# Patient Record
Sex: MALE | Race: WHITE | Employment: OTHER | URBAN - METROPOLITAN AREA
[De-identification: names, ages, dates, MRNs, and addresses within clinical notes are randomized per-mention and may not be internally consistent; named-entity substitution may affect disease eponyms.]

---

## 2020-07-03 ENCOUNTER — APPOINTMENT (OUTPATIENT)
Dept: GENERAL RADIOLOGY | Age: 75
DRG: 286 | End: 2020-07-03
Attending: EMERGENCY MEDICINE
Payer: MEDICARE

## 2020-07-03 ENCOUNTER — HOSPITAL ENCOUNTER (EMERGENCY)
Age: 75
Discharge: ANOTHER HOSPITAL | DRG: 286 | End: 2020-07-03
Attending: EMERGENCY MEDICINE
Payer: MEDICARE

## 2020-07-03 ENCOUNTER — HOSPITAL ENCOUNTER (INPATIENT)
Age: 75
LOS: 5 days | Discharge: HOME | DRG: 286 | End: 2020-07-08
Attending: INTERNAL MEDICINE | Admitting: INTERNAL MEDICINE
Payer: MEDICARE

## 2020-07-03 VITALS
BODY MASS INDEX: 27.09 KG/M2 | OXYGEN SATURATION: 97 % | HEART RATE: 98 BPM | HEIGHT: 72 IN | WEIGHT: 200 LBS | TEMPERATURE: 98.1 F | RESPIRATION RATE: 16 BRPM | DIASTOLIC BLOOD PRESSURE: 98 MMHG | SYSTOLIC BLOOD PRESSURE: 160 MMHG

## 2020-07-03 DIAGNOSIS — I48.91 NEW ONSET A-FIB (HCC): Primary | ICD-10-CM

## 2020-07-03 DIAGNOSIS — I50.9 CONGESTIVE HEART FAILURE, UNSPECIFIED HF CHRONICITY, UNSPECIFIED HEART FAILURE TYPE (HCC): ICD-10-CM

## 2020-07-03 PROBLEM — R60.0 BILATERAL LOWER EXTREMITY EDEMA: Status: ACTIVE | Noted: 2020-07-03

## 2020-07-03 PROBLEM — E87.6 HYPOKALEMIA: Status: ACTIVE | Noted: 2020-07-03

## 2020-07-03 PROBLEM — R06.02 SOB (SHORTNESS OF BREATH): Status: ACTIVE | Noted: 2020-07-03

## 2020-07-03 PROBLEM — R53.83 FATIGUE: Status: ACTIVE | Noted: 2020-07-03

## 2020-07-03 LAB
ALBUMIN SERPL-MCNC: 4 G/DL (ref 3.2–4.6)
ALBUMIN/GLOB SERPL: 1.2 {RATIO} (ref 1.2–3.5)
ALP SERPL-CCNC: 109 U/L (ref 50–136)
ALT SERPL-CCNC: 27 U/L (ref 12–65)
ANION GAP SERPL CALC-SCNC: 8 MMOL/L (ref 7–16)
APTT PPP: 34 SEC (ref 24.3–35.4)
AST SERPL-CCNC: 23 U/L (ref 15–37)
BASOPHILS # BLD: 0.1 K/UL (ref 0–0.2)
BASOPHILS NFR BLD: 1 % (ref 0–2)
BILIRUB SERPL-MCNC: 1.4 MG/DL (ref 0.2–1.1)
BNP SERPL-MCNC: 6251 PG/ML (ref 5–125)
BUN SERPL-MCNC: 10 MG/DL (ref 8–23)
CALCIUM SERPL-MCNC: 8.9 MG/DL (ref 8.3–10.4)
CHLORIDE SERPL-SCNC: 113 MMOL/L (ref 98–107)
CO2 SERPL-SCNC: 24 MMOL/L (ref 21–32)
CREAT SERPL-MCNC: 1.1 MG/DL (ref 0.8–1.5)
DIFFERENTIAL METHOD BLD: ABNORMAL
EOSINOPHIL # BLD: 0.1 K/UL (ref 0–0.8)
EOSINOPHIL NFR BLD: 1 % (ref 0.5–7.8)
ERYTHROCYTE [DISTWIDTH] IN BLOOD BY AUTOMATED COUNT: 14.8 % (ref 11.9–14.6)
GLOBULIN SER CALC-MCNC: 3.4 G/DL (ref 2.3–3.5)
GLUCOSE SERPL-MCNC: 112 MG/DL (ref 65–100)
HCT VFR BLD AUTO: 39.4 % (ref 41.1–50.3)
HGB BLD-MCNC: 13 G/DL (ref 13.6–17.2)
IMM GRANULOCYTES # BLD AUTO: 0 K/UL (ref 0–0.5)
IMM GRANULOCYTES NFR BLD AUTO: 0 % (ref 0–5)
LYMPHOCYTES # BLD: 1.6 K/UL (ref 0.5–4.6)
LYMPHOCYTES NFR BLD: 19 % (ref 13–44)
MAGNESIUM SERPL-MCNC: 2 MG/DL (ref 1.8–2.4)
MCH RBC QN AUTO: 31.3 PG (ref 26.1–32.9)
MCHC RBC AUTO-ENTMCNC: 33 G/DL (ref 31.4–35)
MCV RBC AUTO: 94.7 FL (ref 79.6–97.8)
MONOCYTES # BLD: 0.7 K/UL (ref 0.1–1.3)
MONOCYTES NFR BLD: 8 % (ref 4–12)
NEUTS SEG # BLD: 6.1 K/UL (ref 1.7–8.2)
NEUTS SEG NFR BLD: 72 % (ref 43–78)
NRBC # BLD: 0 K/UL (ref 0–0.2)
PLATELET # BLD AUTO: 154 K/UL (ref 150–450)
PMV BLD AUTO: 10.5 FL (ref 9.4–12.3)
POTASSIUM SERPL-SCNC: 2.7 MMOL/L (ref 3.5–5.1)
PROT SERPL-MCNC: 7.4 G/DL (ref 6.3–8.2)
RBC # BLD AUTO: 4.16 M/UL (ref 4.23–5.6)
SODIUM SERPL-SCNC: 145 MMOL/L (ref 136–145)
TROPONIN-HIGH SENSITIVITY: 98.5 PG/ML (ref 0–14)
TSH SERPL DL<=0.005 MIU/L-ACNC: 1.63 UIU/ML
WBC # BLD AUTO: 8.6 K/UL (ref 4.3–11.1)

## 2020-07-03 PROCEDURE — B2111ZZ FLUOROSCOPY OF MULTIPLE CORONARY ARTERIES USING LOW OSMOLAR CONTRAST: ICD-10-PCS | Performed by: INTERNAL MEDICINE

## 2020-07-03 PROCEDURE — 93005 ELECTROCARDIOGRAM TRACING: CPT | Performed by: EMERGENCY MEDICINE

## 2020-07-03 PROCEDURE — 74011000250 HC RX REV CODE- 250: Performed by: NURSE PRACTITIONER

## 2020-07-03 PROCEDURE — 74011250637 HC RX REV CODE- 250/637: Performed by: EMERGENCY MEDICINE

## 2020-07-03 PROCEDURE — 85730 THROMBOPLASTIN TIME PARTIAL: CPT

## 2020-07-03 PROCEDURE — 4A023N7 MEASUREMENT OF CARDIAC SAMPLING AND PRESSURE, LEFT HEART, PERCUTANEOUS APPROACH: ICD-10-PCS | Performed by: INTERNAL MEDICINE

## 2020-07-03 PROCEDURE — 96374 THER/PROPH/DIAG INJ IV PUSH: CPT

## 2020-07-03 PROCEDURE — 85025 COMPLETE CBC W/AUTO DIFF WBC: CPT

## 2020-07-03 PROCEDURE — B2151ZZ FLUOROSCOPY OF LEFT HEART USING LOW OSMOLAR CONTRAST: ICD-10-PCS | Performed by: INTERNAL MEDICINE

## 2020-07-03 PROCEDURE — 71046 X-RAY EXAM CHEST 2 VIEWS: CPT

## 2020-07-03 PROCEDURE — 74011250636 HC RX REV CODE- 250/636: Performed by: EMERGENCY MEDICINE

## 2020-07-03 PROCEDURE — 74011000258 HC RX REV CODE- 258: Performed by: NURSE PRACTITIONER

## 2020-07-03 PROCEDURE — 83735 ASSAY OF MAGNESIUM: CPT

## 2020-07-03 PROCEDURE — 96375 TX/PRO/DX INJ NEW DRUG ADDON: CPT

## 2020-07-03 PROCEDURE — 99284 EMERGENCY DEPT VISIT MOD MDM: CPT

## 2020-07-03 PROCEDURE — 84484 ASSAY OF TROPONIN QUANT: CPT

## 2020-07-03 PROCEDURE — 80053 COMPREHEN METABOLIC PANEL: CPT

## 2020-07-03 PROCEDURE — 84443 ASSAY THYROID STIM HORMONE: CPT

## 2020-07-03 PROCEDURE — 83880 ASSAY OF NATRIURETIC PEPTIDE: CPT

## 2020-07-03 PROCEDURE — 74011000250 HC RX REV CODE- 250: Performed by: EMERGENCY MEDICINE

## 2020-07-03 PROCEDURE — 65660000000 HC RM CCU STEPDOWN

## 2020-07-03 PROCEDURE — 74011250636 HC RX REV CODE- 250/636: Performed by: NURSE PRACTITIONER

## 2020-07-03 RX ORDER — HEPARIN SODIUM 5000 [USP'U]/100ML
12-25 INJECTION, SOLUTION INTRAVENOUS
Status: DISCONTINUED | OUTPATIENT
Start: 2020-07-03 | End: 2020-07-03 | Stop reason: HOSPADM

## 2020-07-03 RX ORDER — NALOXONE HYDROCHLORIDE 0.4 MG/ML
0.4 INJECTION, SOLUTION INTRAMUSCULAR; INTRAVENOUS; SUBCUTANEOUS AS NEEDED
Status: DISCONTINUED | OUTPATIENT
Start: 2020-07-03 | End: 2020-07-08 | Stop reason: HOSPADM

## 2020-07-03 RX ORDER — SODIUM CHLORIDE 0.9 % (FLUSH) 0.9 %
5-40 SYRINGE (ML) INJECTION AS NEEDED
Status: DISCONTINUED | OUTPATIENT
Start: 2020-07-03 | End: 2020-07-08 | Stop reason: HOSPADM

## 2020-07-03 RX ORDER — POTASSIUM CHLORIDE 14.9 MG/ML
20 INJECTION INTRAVENOUS
Status: COMPLETED | OUTPATIENT
Start: 2020-07-03 | End: 2020-07-03

## 2020-07-03 RX ORDER — DOCUSATE SODIUM 100 MG/1
100 CAPSULE, LIQUID FILLED ORAL
Status: DISCONTINUED | OUTPATIENT
Start: 2020-07-03 | End: 2020-07-08 | Stop reason: HOSPADM

## 2020-07-03 RX ORDER — MORPHINE SULFATE 2 MG/ML
2 INJECTION, SOLUTION INTRAMUSCULAR; INTRAVENOUS
Status: DISCONTINUED | OUTPATIENT
Start: 2020-07-03 | End: 2020-07-08 | Stop reason: HOSPADM

## 2020-07-03 RX ORDER — DILTIAZEM HYDROCHLORIDE 5 MG/ML
10 INJECTION INTRAVENOUS ONCE
Status: COMPLETED | OUTPATIENT
Start: 2020-07-03 | End: 2020-07-03

## 2020-07-03 RX ORDER — MAG HYDROX/ALUMINUM HYD/SIMETH 200-200-20
30 SUSPENSION, ORAL (FINAL DOSE FORM) ORAL
Status: DISCONTINUED | OUTPATIENT
Start: 2020-07-03 | End: 2020-07-08 | Stop reason: HOSPADM

## 2020-07-03 RX ORDER — HEPARIN SODIUM 5000 [USP'U]/100ML
12-25 INJECTION, SOLUTION INTRAVENOUS
Status: DISCONTINUED | OUTPATIENT
Start: 2020-07-03 | End: 2020-07-04

## 2020-07-03 RX ORDER — SODIUM CHLORIDE 0.9 % (FLUSH) 0.9 %
5-40 SYRINGE (ML) INJECTION EVERY 8 HOURS
Status: DISCONTINUED | OUTPATIENT
Start: 2020-07-03 | End: 2020-07-08 | Stop reason: HOSPADM

## 2020-07-03 RX ORDER — ACETAMINOPHEN 325 MG/1
650 TABLET ORAL
Status: DISCONTINUED | OUTPATIENT
Start: 2020-07-03 | End: 2020-07-08 | Stop reason: HOSPADM

## 2020-07-03 RX ORDER — DIPHENHYDRAMINE HCL 25 MG
25 CAPSULE ORAL
Status: DISCONTINUED | OUTPATIENT
Start: 2020-07-03 | End: 2020-07-05

## 2020-07-03 RX ORDER — HEPARIN SODIUM 5000 [USP'U]/ML
60 INJECTION, SOLUTION INTRAVENOUS; SUBCUTANEOUS
Status: COMPLETED | OUTPATIENT
Start: 2020-07-03 | End: 2020-07-03

## 2020-07-03 RX ORDER — POTASSIUM CHLORIDE 20 MEQ/1
20 TABLET, EXTENDED RELEASE ORAL
Status: COMPLETED | OUTPATIENT
Start: 2020-07-03 | End: 2020-07-03

## 2020-07-03 RX ORDER — METOPROLOL TARTRATE 5 MG/5ML
5 INJECTION INTRAVENOUS ONCE
Status: COMPLETED | OUTPATIENT
Start: 2020-07-03 | End: 2020-07-03

## 2020-07-03 RX ADMIN — POTASSIUM CHLORIDE 20 MEQ: 14.9 INJECTION, SOLUTION INTRAVENOUS at 18:05

## 2020-07-03 RX ADMIN — POTASSIUM CHLORIDE 20 MEQ: 1500 TABLET, EXTENDED RELEASE ORAL at 17:54

## 2020-07-03 RX ADMIN — SODIUM CHLORIDE 2.5 MG/HR: 900 INJECTION, SOLUTION INTRAVENOUS at 21:10

## 2020-07-03 RX ADMIN — Medication 10 ML: at 21:41

## 2020-07-03 RX ADMIN — DILTIAZEM HYDROCHLORIDE 10 MG: 5 INJECTION, SOLUTION INTRAVENOUS at 20:26

## 2020-07-03 RX ADMIN — HEPARIN SODIUM 5450 UNITS: 5000 INJECTION INTRAVENOUS; SUBCUTANEOUS at 17:56

## 2020-07-03 RX ADMIN — METOPROLOL TARTRATE 5 MG: 5 INJECTION INTRAVENOUS at 16:13

## 2020-07-03 RX ADMIN — HEPARIN SODIUM 12 UNITS/KG/HR: 5000 INJECTION, SOLUTION INTRAVENOUS at 18:00

## 2020-07-03 NOTE — H&P
[facility] CARDIOLOGY   History & Physical                 Primary Cardiologist: None    Primary Care Physician: None    Admitting Physician: Dr. Jose Burciaga:     Patient is a 76 y.o.  male with PMHx of HTN, Anxiety and Thoracic Aortic Aneurysm who presented to the ED at [facility] today with c/o progressive weakness/fatigue and SOB that has worsened over the past month, and BLE edema that started yesterday. He had actually gone to an Urgent Care to have a COVID19 test done in order to get established with his PCP. However, once there he was found to have a tachy, irregular heart rate and HTN. He was sent to the ED at [facility] for further evaluation. In the ED: EKG showed AFib RVR. He was treated with 5mg IV Lopressor with improvement in his HR. He was given IV Heparin bolus/gtt. CXR with mild interstitial edema. He was found to be hypokalemic and was given PO and IV supplementation. Cardiology was called in regards to his arrhythmia. He reports having occasional tachy-palpitations, mostly noted at night when lying down to sleep. He states he has had an irregular heart rate \"for years. \" He denies prior known coronary disease, no MI, PCI or LHC. No CP/pressure. States he saw Cardiology at [facility] re: palpitations and was told they would monitor his HR, but he never went back. States he has had \"borderline\" high BP for years but it was never Tavares Mayi enough\" for meds. States that he is a \"very nervous person\" and worked hard to keep his emotions in check and BP controlled. States no issues with HLD or DM. Prior smoker 1ppd, quit 6 yrs ago. Former ETOH use mostly weekend partying, quit 6 years ago. States he frequently has a cough with some phlegm, takes Robitussin occasionally. Has never seen Pulmonology for any COPD w/u. States he works inside and outside his house and has noted decrease capacity to do this over the past month. He states no known COVID exposures. No F/C/S.  States his wife has a thermal temperature scanner and has been checking his temp every day has not been over 97F. HPI from [facility] 3/2016:  Mr. Atilio Drake is a 51-year-old gentleman recently evaluated by Dr. Yousif Victoria who presents today to review recent noninvasive cardiac evaluations. Mr. Atilio Drake had an abnormal EKG showing probable inferior Q waves and nonspecific intraventricular conduction delay. He was having atypical chest pain and was unable to participate in greater than 4 METS of activity due to sedentary lifestyle and severe bilateral carpal tunnel syndrome. Recently it was discovered he had hypertension and he started on Hydrochlorothiazide and he has had fairly good blood pressure results with this. Mr. Atilio Drake underwent a noninvasive cardiac evaluation. His nuclear stress test demonstrating normal perfusion. It did suggest he had a possibly moderate LV systolic dysfunction with global hypokinesis his estimated ejection fraction by imaging was 35%. He was noted to have frequent PACs. HIs echocardiogram demonstrated low normal ejection fraction with no evidence of global hypokinesis. Estimated ejection fraction of 50 to 54%. HE had Grade 1 diastolic dysfunction. Of note he did have a moderately dilated aortic root and moderately dilated ascending aorta with mild aortic insufficiency. His aortic root diameter is 4.6 cm. No past medical history on file. No past surgical history on file. No Known Allergies  Social History     Tobacco Use    Smoking status: Not on file   Substance Use Topics    Alcohol use: Not on file      FH: No family history on file.      Review of Systems  General: no weight change, +weakness, +fatigue, no fever or chills, no diaphoresis  Skin: no rashes, lumps, wounds, sores or other skin changes  HEENT: no headache, dizziness, lightheadedness, vision changes, hearing changes, tinnitus, vertigo, sinus pressure/pain, bleeding gums, sore throat, or hoarseness  Neck: no swollen glands, goiter, pain or stiffness  Respiratory: no increased cough, no congestion, no sputum, no hemoptysis, +dyspnea, no wheezing  Cardiovascular: + as per HPI  Gastrointestinal: no increased reflux, no constipation, diarrhea, liver problems, GI bleeding, N/V, no abdominal pain or distension  Urinary: no frequency, urgency, hematuria, burning/pain with urination, flank pain, polyuria, nocturia, or difficulty urinating  Peripheral Vascular: no claudication, leg cramps, prior DVTs, +swelling of BLE, no color change, or swelling with redness or tenderness  Musculoskeletal: no muscle or joint pain/stiffness, joint swelling, erythema of joints, or back pain  Psychiatric: no increased depression, anxiety or excessive stress  Neurological: no sensory or motor loss, seizures, syncope, tremors, numbness, tingling, no changes in mood, attention, or speech, no changes in orientation, memory, insight, or judgment. Hematologic: no anemia, easy bruising or bleeding  Endocrine: no thyroid problems, no heat or cold intolerance, excessive sweating, polyuria, polydipsia, no diabetes. Objective: There were no vitals taken for this visit. No intake/output data recorded. No intake/output data recorded.     Physical Exam:  General: well developed, well nourished, NAD, resting comfortably  HEENT: pupils equal and round, no abnormalities noted, sclera clear, EOMs intact  Neck: supple, no JVD, no carotid bruits  Heart: S1S2 with RRR without murmurs, rubs or gallops  Lungs: clear throughout auscultation bilaterally, normal effort on RA, no wheezing or rales  Abd: soft, nontender, nondistended, +bowel sounds  Ext: warm, no edema, calves supple/nontender, pulses 2+ bilaterally  Skin: warm and dry, intact  Psychiatric: appropriate mood and affect, cooperative  Neurologic: A&O X 3, moves all 4 equally, CNs intact      ECG: AFib RVR no acute ST changes    ECHO: ordered and pending    CXR: mild interstitial edema    Data Review:      Recent Results (from the past 24 hour(s))   EKG, 12 LEAD, INITIAL    Collection Time: 07/03/20  3:37 PM   Result Value Ref Range    Ventricular Rate 105 BPM    Atrial Rate 105 BPM    P-R Interval 208 ms    QRS Duration 90 ms    Q-T Interval 324 ms    QTC Calculation (Bezet) 428 ms    Calculated P Axis 60 degrees    Calculated R Axis 22 degrees    Calculated T Axis 108 degrees    Diagnosis       !! AGE AND GENDER SPECIFIC ECG ANALYSIS !! Sinus tachycardia with Premature atrial complexes  ST & T wave abnormality, consider lateral ischemia  Abnormal ECG  No previous ECGs available     CBC WITH AUTOMATED DIFF    Collection Time: 07/03/20  4:05 PM   Result Value Ref Range    WBC 8.6 4.3 - 11.1 K/uL    RBC 4.16 (L) 4.23 - 5.6 M/uL    HGB 13.0 (L) 13.6 - 17.2 g/dL    HCT 39.4 (L) 41.1 - 50.3 %    MCV 94.7 79.6 - 97.8 FL    MCH 31.3 26.1 - 32.9 PG    MCHC 33.0 31.4 - 35.0 g/dL    RDW 14.8 (H) 11.9 - 14.6 %    PLATELET 056 034 - 211 K/uL    MPV 10.5 9.4 - 12.3 FL    ABSOLUTE NRBC 0.00 0.0 - 0.2 K/uL    DF AUTOMATED      NEUTROPHILS 72 43 - 78 %    LYMPHOCYTES 19 13 - 44 %    MONOCYTES 8 4.0 - 12.0 %    EOSINOPHILS 1 0.5 - 7.8 %    BASOPHILS 1 0.0 - 2.0 %    IMMATURE GRANULOCYTES 0 0.0 - 5.0 %    ABS. NEUTROPHILS 6.1 1.7 - 8.2 K/UL    ABS. LYMPHOCYTES 1.6 0.5 - 4.6 K/UL    ABS. MONOCYTES 0.7 0.1 - 1.3 K/UL    ABS. EOSINOPHILS 0.1 0.0 - 0.8 K/UL    ABS. BASOPHILS 0.1 0.0 - 0.2 K/UL    ABS. IMM.  GRANS. 0.0 0.0 - 0.5 K/UL   METABOLIC PANEL, COMPREHENSIVE    Collection Time: 07/03/20  4:05 PM   Result Value Ref Range    Sodium 145 136 - 145 mmol/L    Potassium 2.7 (LL) 3.5 - 5.1 mmol/L    Chloride 113 (H) 98 - 107 mmol/L    CO2 24 21 - 32 mmol/L    Anion gap 8 7 - 16 mmol/L    Glucose 112 (H) 65 - 100 mg/dL    BUN 10 8 - 23 MG/DL    Creatinine 1.10 0.8 - 1.5 MG/DL    GFR est AA >60 >60 ml/min/1.73m2    GFR est non-AA >60 >60 ml/min/1.73m2    Calcium 8.9 8.3 - 10.4 MG/DL    Bilirubin, total 1.4 (H) 0.2 - 1.1 MG/DL    ALT (SGPT) 27 12 - 65 U/L AST (SGOT) 23 15 - 37 U/L    Alk. phosphatase 109 50 - 136 U/L    Protein, total 7.4 6.3 - 8.2 g/dL    Albumin 4.0 3.2 - 4.6 g/dL    Globulin 3.4 2.3 - 3.5 g/dL    A-G Ratio 1.2 1.2 - 3.5     TROPONIN-HIGH SENSITIVITY    Collection Time: 07/03/20  4:05 PM   Result Value Ref Range    Troponin-High Sensitivity 98.5 (HH) 0 - 14 pg/mL   MAGNESIUM    Collection Time: 07/03/20  4:05 PM   Result Value Ref Range    Magnesium 2.0 1.8 - 2.4 mg/dL   TSH 3RD GENERATION    Collection Time: 07/03/20  4:05 PM   Result Value Ref Range    TSH 1.630 uIU/mL   NT-PRO BNP    Collection Time: 07/03/20  4:05 PM   Result Value Ref Range    NT pro-BNP 6,251 (H) 5 - 125 PG/ML   PTT    Collection Time: 07/03/20  4:05 PM   Result Value Ref Range    aPTT 34.0 24.3 - 35.4 SEC   EKG, 12 LEAD, SUBSEQUENT    Collection Time: 07/03/20  4:17 PM   Result Value Ref Range    Ventricular Rate 96 BPM    Atrial Rate 120 BPM    QRS Duration 96 ms    Q-T Interval 310 ms    QTC Calculation (Bezet) 391 ms    Calculated R Axis 19 degrees    Calculated T Axis 113 degrees    Diagnosis       !! AGE AND GENDER SPECIFIC ECG ANALYSIS !!   Atrial fibrillation  Nonspecific ST and T wave abnormality , probably digitalis effect  Abnormal ECG  When compared with ECG of 03-JUL-2020 15:37,  Atrial fibrillation has replaced Sinus rhythm           Assessment/Plan:   Principal Problem:    Atrial fibrillation with rapid ventricular response, uncontrolled -- tachyarrhythmia with weakness/fatigue, SOB and BLE edema -- likely having some tachy-CM from uncontrolled AFib at home, unknown exact duration -- possibly the past month, will plan to cont IV heparin, start IV diltiazem and hope for conversion, NPO at MN for poss IMAN/DCCV in AM, f/u daily labs/lytes to keep K >4, Mag >2, check ECHO and add additional meds as clinically indicated     Active Problems:    Fatigue -- TSH ok, check ECHO      SOB (shortness of breath) -- rhythm control, check ECHO in AM      Bilateral lower extremity edema -- mild, check ECHO in AM, FR and daily weights, consider IV Lasix for worsening resp fxn -- but need to get K up first       Hypokalemia -- supplemented in ED, f/u AM labs    ZARA Nicolas  7/3/2020  6:36 PM    Attending Addendum    Patient independently seen and examined by me. Agree with above note by physician extender with the following additions and exceptions: 76 y.o.  male with PMHx of HTN, Anxiety and Thoracic Aortic Aneurysm who presented to the ED at [facility] today with c/o progressive weakness/fatigue and SOB that has worsened over the past month, and BLE edema and was noted to be in afib with RVR    Key findings are:  No CP or WILLAMS  CV- irreg irreg, tachycardic without murmur  Lungs- Clear bilaterally  Ext- no edema    Plan: check TTE, transition to eliquis 5mg Q12H       --appears Pt has been having periods of NSR on telemetry       --antiarrhythmic based on TTE results       --if afib sustains, may need to consider IMAN guided DCCV tomorrow       --further plan as noted above and pending clinical course    Amie Paredes Cardiology

## 2020-07-03 NOTE — ROUTINE PROCESS
TRANSFER - IN REPORT: 
 
Verbal report received from [facility], [address] [place] [address] at [id number] on Asif Lott being received from [facility] for routine progression of care. Report consisted of patients Situation, Background, Assessment and Recommendations(SBAR). Information from the following report(s) SBAR, Kardex, ED Summary, Intake/Output, MAR, Recent Results and Cardiac Rhythm Afib was reviewed. Opportunity for questions and clarification was provided. Assessment completed upon patients arrival to unit and care assumed. Patient received to room 324. Shift change report given to Kelly Reilly RN who will assume care for patient.

## 2020-07-03 NOTE — ED NOTES
TRANSFER - OUT REPORT:    Verbal report given to Jose Haile on Jeoffrey Ee  being transferred to [address] [place] [address] for routine progression of care       Report consisted of patients Situation, Background, Assessment and   Recommendations(SBAR). Information from the following report(s) ED Summary, MAR and Recent Results was reviewed with the receiving nurse. Lines:   Peripheral IV 07/03/20 Left Antecubital (Active)        Opportunity for questions and clarification was provided.       Patient transported with:   Monitor  Tech

## 2020-07-03 NOTE — ED TRIAGE NOTES
Pt states he has had an irregular heart rate for \"years,\" states he has had his heart checked before and told his heart looks fine. Pt states last couple weeks he has been feeling SOB with palpitations.  Masked on arrival.

## 2020-07-03 NOTE — ROUTINE PROCESS
TRANSFER - IN REPORT: 
 
Verbal report received from [facility] on Gwen Andre being received from [place] for routine progression of care. Report consisted of patients Situation, Background, Assessment and Recommendations(SBAR). Information from the following report(s) SBAR, Kardex, ED Summary, Intake/Output, MAR, Recent Results and Cardiac Rhythm Afib was reviewed. Opportunity for questions and clarification was provided. Assessment completed upon patients arrival to unit and care assumed. Patient received to room 324. Patient connected to monitor and assessment completed. Plan of care reviewed. Patient oriented to room and call light. Patient aware to use call light to communicate any chest pain or needs. Admission skin assessment completed with second RN and reveals the following:

## 2020-07-03 NOTE — ROUTINE PROCESS
Bedside and Verbal shift change report given to self (oncoming nurse) by Tung Estevez RN (offgoing nurse). Report included the following information SBAR, Kardex, ED Summary, Procedure Summary, Intake/Output, MAR, Recent Results New patient to floor. Patients sacrum and heels are intact. BLE edema is present 1+ pitting. No other skin issues noted.

## 2020-07-03 NOTE — ED PROVIDER NOTES
Patient presents to the emergency department with complaints of generalized weakness as well as swelling of the lower extremities. He went to an urgent care to be checked for COVID although he has had no known exposures and denies any fever, chills, cough or congestion. While at the urgent care he was noted to have tachycardia with an irregular rhythm. The patient denies any sensation of irregular heartbeat or tachycardia at the current time although he states he occasionally will have episodes where he has palpitations that he notices primarily when he is lying in bed to go to sleep. He is currently on no medications. The swelling in the legs is been present for several days. The history is provided by the patient. Palpitations    This is a new problem. Episode onset: Unknown onset. The problem has not changed since onset. The problem is associated with an unknown factor. Associated symptoms include lower extremity edema and weakness. Pertinent negatives include no diaphoresis, no fever, no malaise/fatigue, no numbness, no chest pain, no chest pressure, no claudication, no exertional chest pressure, no irregular heartbeat, no near-syncope, no orthopnea, no PND, no syncope, no abdominal pain, no nausea, no vomiting, no headaches, no back pain, no leg pain, no dizziness, no cough, no hemoptysis, no shortness of breath and no sputum production. Risk factors include hypertension. He has tried nothing for the symptoms. The treatment provided no relief. No past medical history on file. No past surgical history on file. No family history on file.     Social History     Socioeconomic History    Marital status:      Spouse name: Not on file    Number of children: Not on file    Years of education: Not on file    Highest education level: Not on file   Occupational History    Not on file   Social Needs    Financial resource strain: Not on file    Food insecurity     Worry: Not on file Inability: Not on file    Transportation needs     Medical: Not on file     Non-medical: Not on file   Tobacco Use    Smoking status: Not on file   Substance and Sexual Activity    Alcohol use: Not on file    Drug use: Not on file    Sexual activity: Not on file   Lifestyle    Physical activity     Days per week: Not on file     Minutes per session: Not on file    Stress: Not on file   Relationships    Social connections     Talks on phone: Not on file     Gets together: Not on file     Attends Alevism service: Not on file     Active member of club or organization: Not on file     Attends meetings of clubs or organizations: Not on file     Relationship status: Not on file    Intimate partner violence     Fear of current or ex partner: Not on file     Emotionally abused: Not on file     Physically abused: Not on file     Forced sexual activity: Not on file   Other Topics Concern    Not on file   Social History Narrative    Not on file         ALLERGIES: Patient has no known allergies. Review of Systems   Constitutional: Negative for diaphoresis, fever and malaise/fatigue. Respiratory: Negative for cough, hemoptysis, sputum production and shortness of breath. Cardiovascular: Positive for palpitations. Negative for chest pain, orthopnea, claudication, syncope, PND and near-syncope. Gastrointestinal: Negative for abdominal pain, nausea and vomiting. Musculoskeletal: Negative for back pain. Neurological: Positive for weakness. Negative for dizziness, numbness and headaches. All other systems reviewed and are negative. Vitals:    07/03/20 1535 07/03/20 1537 07/03/20 1539   BP: (!) 191/98     Pulse:  (!) 124 (!) 116   Resp:  18    Temp:  97.8 °F (36.6 °C)    SpO2:  94% 95%   Weight:  90.7 kg (200 lb)    Height:  6' (1.829 m)             Physical Exam  Vitals signs and nursing note reviewed. Constitutional:       General: He is not in acute distress. Appearance: Normal appearance.  He is not ill-appearing, toxic-appearing or diaphoretic. HENT:      Head: Normocephalic and atraumatic. Mouth/Throat:      Mouth: Mucous membranes are moist.      Pharynx: Oropharynx is clear. No oropharyngeal exudate or posterior oropharyngeal erythema. Eyes:      Extraocular Movements: Extraocular movements intact. Conjunctiva/sclera: Conjunctivae normal.      Pupils: Pupils are equal, round, and reactive to light. Neck:      Musculoskeletal: Normal range of motion and neck supple. Cardiovascular:      Rate and Rhythm: Tachycardia present. Rhythm irregular. Pulses: Normal pulses. Heart sounds: Normal heart sounds. Pulmonary:      Effort: Pulmonary effort is normal.      Breath sounds: Normal breath sounds. Abdominal:      General: There is no distension. Palpations: Abdomen is soft. Tenderness: There is no abdominal tenderness. Musculoskeletal:      Right lower leg: Edema present. Left lower leg: Edema present. Lymphadenopathy:      Cervical: No cervical adenopathy. Skin:     General: Skin is warm and dry. Capillary Refill: Capillary refill takes less than 2 seconds. Neurological:      General: No focal deficit present. Mental Status: He is alert and oriented to person, place, and time. Mental status is at baseline. Psychiatric:         Mood and Affect: Mood normal.         Behavior: Behavior normal.         Thought Content: Thought content normal.          MDM  Number of Diagnoses or Management Options     Amount and/or Complexity of Data Reviewed  Clinical lab tests: ordered and reviewed  Tests in the radiology section of CPT®: ordered and reviewed  Review and summarize past medical records: yes  Discuss the patient with other providers: yes  Independent visualization of images, tracings, or specimens: yes (EKG at 1537 shows undetermined rhythm with a rate of 105 bpm.  Electrical evidence of left ventricular hypertrophy noted.   No acute ischemic changes. Repeat EKG at 16 5:17 milligrams of Lopressor, atrial fibrillation with a rate of 96 with nonspecific ST and T wave abnormalities and LVH.   No acute ischemic changes.)    Risk of Complications, Morbidity, and/or Mortality  Presenting problems: moderate  Diagnostic procedures: moderate  Management options: moderate    Patient Progress  Patient progress: stable         Procedures

## 2020-07-04 LAB
ANION GAP SERPL CALC-SCNC: 9 MMOL/L (ref 7–16)
APTT PPP: 45.7 SEC (ref 24.3–35.4)
APTT PPP: 74 SEC (ref 24.3–35.4)
ATRIAL RATE: 105 BPM
ATRIAL RATE: 120 BPM
BUN SERPL-MCNC: 10 MG/DL (ref 8–23)
CALCIUM SERPL-MCNC: 8.4 MG/DL (ref 8.3–10.4)
CALCULATED P AXIS, ECG09: 60 DEGREES
CALCULATED R AXIS, ECG10: 19 DEGREES
CALCULATED R AXIS, ECG10: 22 DEGREES
CALCULATED T AXIS, ECG11: 108 DEGREES
CALCULATED T AXIS, ECG11: 113 DEGREES
CHLORIDE SERPL-SCNC: 113 MMOL/L (ref 98–107)
CO2 SERPL-SCNC: 24 MMOL/L (ref 21–32)
CREAT SERPL-MCNC: 0.98 MG/DL (ref 0.8–1.5)
DIAGNOSIS, 93000: NORMAL
DIAGNOSIS, 93000: NORMAL
ERYTHROCYTE [DISTWIDTH] IN BLOOD BY AUTOMATED COUNT: 14.9 % (ref 11.9–14.6)
GLUCOSE BLD STRIP.AUTO-MCNC: 102 MG/DL (ref 65–100)
GLUCOSE SERPL-MCNC: 105 MG/DL (ref 65–100)
HCT VFR BLD AUTO: 37.9 % (ref 41.1–50.3)
HGB BLD-MCNC: 12.2 G/DL (ref 13.6–17.2)
MAGNESIUM SERPL-MCNC: 2 MG/DL (ref 1.8–2.4)
MCH RBC QN AUTO: 31 PG (ref 26.1–32.9)
MCHC RBC AUTO-ENTMCNC: 32.2 G/DL (ref 31.4–35)
MCV RBC AUTO: 96.2 FL (ref 79.6–97.8)
NRBC # BLD: 0 K/UL (ref 0–0.2)
P-R INTERVAL, ECG05: 208 MS
PLATELET # BLD AUTO: 144 K/UL (ref 150–450)
PMV BLD AUTO: 11.4 FL (ref 9.4–12.3)
POTASSIUM SERPL-SCNC: 2.7 MMOL/L (ref 3.5–5.1)
Q-T INTERVAL, ECG07: 310 MS
Q-T INTERVAL, ECG07: 324 MS
QRS DURATION, ECG06: 90 MS
QRS DURATION, ECG06: 96 MS
QTC CALCULATION (BEZET), ECG08: 391 MS
QTC CALCULATION (BEZET), ECG08: 428 MS
RBC # BLD AUTO: 3.94 M/UL (ref 4.23–5.6)
SODIUM SERPL-SCNC: 146 MMOL/L (ref 136–145)
VENTRICULAR RATE, ECG03: 105 BPM
VENTRICULAR RATE, ECG03: 96 BPM
WBC # BLD AUTO: 7.5 K/UL (ref 4.3–11.1)

## 2020-07-04 PROCEDURE — 74011000250 HC RX REV CODE- 250: Performed by: INTERNAL MEDICINE

## 2020-07-04 PROCEDURE — 74011250637 HC RX REV CODE- 250/637: Performed by: NURSE PRACTITIONER

## 2020-07-04 PROCEDURE — 74011250637 HC RX REV CODE- 250/637: Performed by: INTERNAL MEDICINE

## 2020-07-04 PROCEDURE — 74011250636 HC RX REV CODE- 250/636: Performed by: NURSE PRACTITIONER

## 2020-07-04 PROCEDURE — 85730 THROMBOPLASTIN TIME PARTIAL: CPT

## 2020-07-04 PROCEDURE — 80048 BASIC METABOLIC PNL TOTAL CA: CPT

## 2020-07-04 PROCEDURE — 93005 ELECTROCARDIOGRAM TRACING: CPT | Performed by: INTERNAL MEDICINE

## 2020-07-04 PROCEDURE — 74011000258 HC RX REV CODE- 258: Performed by: INTERNAL MEDICINE

## 2020-07-04 PROCEDURE — 36415 COLL VENOUS BLD VENIPUNCTURE: CPT

## 2020-07-04 PROCEDURE — 65660000000 HC RM CCU STEPDOWN

## 2020-07-04 PROCEDURE — 82962 GLUCOSE BLOOD TEST: CPT

## 2020-07-04 PROCEDURE — C8929 TTE W OR WO FOL WCON,DOPPLER: HCPCS

## 2020-07-04 PROCEDURE — 74011250636 HC RX REV CODE- 250/636: Performed by: INTERNAL MEDICINE

## 2020-07-04 PROCEDURE — 85027 COMPLETE CBC AUTOMATED: CPT

## 2020-07-04 PROCEDURE — 83735 ASSAY OF MAGNESIUM: CPT

## 2020-07-04 RX ORDER — POTASSIUM CHLORIDE 20 MEQ/1
40 TABLET, EXTENDED RELEASE ORAL 3 TIMES DAILY
Status: DISPENSED | OUTPATIENT
Start: 2020-07-04 | End: 2020-07-06

## 2020-07-04 RX ORDER — POTASSIUM CHLORIDE 20 MEQ/1
40 TABLET, EXTENDED RELEASE ORAL
Status: COMPLETED | OUTPATIENT
Start: 2020-07-04 | End: 2020-07-04

## 2020-07-04 RX ORDER — HEPARIN SODIUM 5000 [USP'U]/ML
30 INJECTION, SOLUTION INTRAVENOUS; SUBCUTANEOUS ONCE
Status: COMPLETED | OUTPATIENT
Start: 2020-07-04 | End: 2020-07-04

## 2020-07-04 RX ORDER — POTASSIUM CHLORIDE 20 MEQ/1
40 TABLET, EXTENDED RELEASE ORAL 2 TIMES DAILY
Status: DISCONTINUED | OUTPATIENT
Start: 2020-07-04 | End: 2020-07-04

## 2020-07-04 RX ADMIN — POTASSIUM CHLORIDE 40 MEQ: 20 TABLET, EXTENDED RELEASE ORAL at 16:04

## 2020-07-04 RX ADMIN — PERFLUTREN 1 ML: 6.52 INJECTION, SUSPENSION INTRAVENOUS at 08:00

## 2020-07-04 RX ADMIN — HEPARIN SODIUM 2750 UNITS: 5000 INJECTION INTRAVENOUS; SUBCUTANEOUS at 02:14

## 2020-07-04 RX ADMIN — Medication 10 ML: at 21:20

## 2020-07-04 RX ADMIN — APIXABAN 5 MG: 5 TABLET, FILM COATED ORAL at 21:15

## 2020-07-04 RX ADMIN — SODIUM CHLORIDE 10 MG/HR: 900 INJECTION, SOLUTION INTRAVENOUS at 09:47

## 2020-07-04 RX ADMIN — POTASSIUM CHLORIDE 40 MEQ: 20 TABLET, EXTENDED RELEASE ORAL at 02:29

## 2020-07-04 RX ADMIN — SODIUM CHLORIDE 10 MG/HR: 900 INJECTION, SOLUTION INTRAVENOUS at 10:47

## 2020-07-04 RX ADMIN — APIXABAN 5 MG: 5 TABLET, FILM COATED ORAL at 09:48

## 2020-07-04 RX ADMIN — POTASSIUM CHLORIDE 40 MEQ: 20 TABLET, EXTENDED RELEASE ORAL at 21:15

## 2020-07-04 RX ADMIN — SODIUM CHLORIDE 10 MG/HR: 900 INJECTION, SOLUTION INTRAVENOUS at 21:16

## 2020-07-04 NOTE — ROUTINE PROCESS
Bedside and Verbal shift change report given to Ben Caicedo RN (oncoming nurse) by self Miryam crowley). Report included the following information SBAR, Kardex, Intake/Output, MAR, Recent Results Heparin gtt verified

## 2020-07-04 NOTE — ROUTINE PROCESS
Bedside and Verbal report given to self by Celestino Sanz RN. Report included SBAR, Kardex, ED Summary, Procedure Summary, Intake and Output and Cardiac Rhythm afib. Heparin gtt infusing at 16 units/kg/hr. Dual sign of with off-going nurse complete.

## 2020-07-04 NOTE — ROUTINE PROCESS
Bedside and Verbal shift change report given to self (oncoming nurse) by Johanna Daniel RN (offgoing nurse). Report included the following information SBAR, Kardex, ED Summary, Procedure Summary, Intake/Output, MAR, Recent Results

## 2020-07-04 NOTE — PROGRESS NOTES
Reviewed notes prior to visit for spiritual concerns    Will follow thru this admission      Klaudia Fischer, staff

## 2020-07-04 NOTE — ROUTINE PROCESS
Bedside and Verbal report given to [address], RN by self. Report included SBAR, Kardex, ED summary, procedure summary, recent results and cardiac rhythm afib/NSR with PVCs.

## 2020-07-05 LAB
ANION GAP SERPL CALC-SCNC: 9 MMOL/L (ref 7–16)
APTT PPP: 40.3 SEC (ref 24.3–35.4)
ATRIAL RATE: 88 BPM
BUN SERPL-MCNC: 12 MG/DL (ref 8–23)
CALCIUM SERPL-MCNC: 8.5 MG/DL (ref 8.3–10.4)
CALCULATED R AXIS, ECG10: -66 DEGREES
CALCULATED T AXIS, ECG11: 95 DEGREES
CHLORIDE SERPL-SCNC: 114 MMOL/L (ref 98–107)
CO2 SERPL-SCNC: 24 MMOL/L (ref 21–32)
CREAT SERPL-MCNC: 0.87 MG/DL (ref 0.8–1.5)
DIAGNOSIS, 93000: NORMAL
ERYTHROCYTE [DISTWIDTH] IN BLOOD BY AUTOMATED COUNT: 15 % (ref 11.9–14.6)
GLUCOSE SERPL-MCNC: 104 MG/DL (ref 65–100)
HCT VFR BLD AUTO: 39.7 % (ref 41.1–50.3)
HGB BLD-MCNC: 12.5 G/DL (ref 13.6–17.2)
MAGNESIUM SERPL-MCNC: 2.2 MG/DL (ref 1.8–2.4)
MCH RBC QN AUTO: 30.4 PG (ref 26.1–32.9)
MCHC RBC AUTO-ENTMCNC: 31.5 G/DL (ref 31.4–35)
MCV RBC AUTO: 96.6 FL (ref 79.6–97.8)
NRBC # BLD: 0 K/UL (ref 0–0.2)
P-R INTERVAL, ECG05: 152 MS
PLATELET # BLD AUTO: 132 K/UL (ref 150–450)
PMV BLD AUTO: 11.4 FL (ref 9.4–12.3)
POTASSIUM SERPL-SCNC: 3.1 MMOL/L (ref 3.5–5.1)
Q-T INTERVAL, ECG07: 406 MS
QRS DURATION, ECG06: 90 MS
QTC CALCULATION (BEZET), ECG08: 491 MS
RBC # BLD AUTO: 4.11 M/UL (ref 4.23–5.6)
SODIUM SERPL-SCNC: 147 MMOL/L (ref 136–145)
VENTRICULAR RATE, ECG03: 88 BPM
WBC # BLD AUTO: 7.3 K/UL (ref 4.3–11.1)

## 2020-07-05 PROCEDURE — 74011250636 HC RX REV CODE- 250/636: Performed by: INTERNAL MEDICINE

## 2020-07-05 PROCEDURE — 80048 BASIC METABOLIC PNL TOTAL CA: CPT

## 2020-07-05 PROCEDURE — 65660000000 HC RM CCU STEPDOWN

## 2020-07-05 PROCEDURE — 83735 ASSAY OF MAGNESIUM: CPT

## 2020-07-05 PROCEDURE — 85027 COMPLETE CBC AUTOMATED: CPT

## 2020-07-05 PROCEDURE — 74011250637 HC RX REV CODE- 250/637: Performed by: INTERNAL MEDICINE

## 2020-07-05 PROCEDURE — 85730 THROMBOPLASTIN TIME PARTIAL: CPT

## 2020-07-05 PROCEDURE — 36415 COLL VENOUS BLD VENIPUNCTURE: CPT

## 2020-07-05 PROCEDURE — 74011000258 HC RX REV CODE- 258: Performed by: INTERNAL MEDICINE

## 2020-07-05 RX ORDER — HEPARIN SODIUM 5000 [USP'U]/100ML
12-25 INJECTION, SOLUTION INTRAVENOUS CONTINUOUS
Status: DISCONTINUED | OUTPATIENT
Start: 2020-07-05 | End: 2020-07-05

## 2020-07-05 RX ORDER — HEPARIN SODIUM 5000 [USP'U]/100ML
22.2 INJECTION, SOLUTION INTRAVENOUS CONTINUOUS
Status: DISCONTINUED | OUTPATIENT
Start: 2020-07-05 | End: 2020-07-05

## 2020-07-05 RX ORDER — HEPARIN SODIUM 5000 [USP'U]/ML
35 INJECTION, SOLUTION INTRAVENOUS; SUBCUTANEOUS ONCE
Status: COMPLETED | OUTPATIENT
Start: 2020-07-05 | End: 2020-07-05

## 2020-07-05 RX ORDER — FUROSEMIDE 10 MG/ML
40 INJECTION INTRAMUSCULAR; INTRAVENOUS ONCE
Status: COMPLETED | OUTPATIENT
Start: 2020-07-05 | End: 2020-07-05

## 2020-07-05 RX ORDER — DIPHENHYDRAMINE HYDROCHLORIDE 50 MG/ML
25 INJECTION, SOLUTION INTRAMUSCULAR; INTRAVENOUS
Status: DISCONTINUED | OUTPATIENT
Start: 2020-07-05 | End: 2020-07-08 | Stop reason: HOSPADM

## 2020-07-05 RX ORDER — HEPARIN SODIUM 5000 [USP'U]/100ML
12-25 INJECTION, SOLUTION INTRAVENOUS
Status: DISCONTINUED | OUTPATIENT
Start: 2020-07-05 | End: 2020-07-06

## 2020-07-05 RX ADMIN — Medication 10 ML: at 06:21

## 2020-07-05 RX ADMIN — POTASSIUM CHLORIDE 40 MEQ: 20 TABLET, EXTENDED RELEASE ORAL at 10:34

## 2020-07-05 RX ADMIN — HEPARIN SODIUM 12 UNITS/KG/HR: 5000 INJECTION, SOLUTION INTRAVENOUS at 10:34

## 2020-07-05 RX ADMIN — HEPARIN SODIUM 16 UNITS/KG/HR: 5000 INJECTION, SOLUTION INTRAVENOUS at 18:30

## 2020-07-05 RX ADMIN — SODIUM CHLORIDE 10 MG/HR: 900 INJECTION, SOLUTION INTRAVENOUS at 10:34

## 2020-07-05 RX ADMIN — AMIODARONE HYDROCHLORIDE 0.5 MG/MIN: 50 INJECTION, SOLUTION INTRAVENOUS at 16:40

## 2020-07-05 RX ADMIN — HEPARIN SODIUM 3250 UNITS: 5000 INJECTION INTRAVENOUS; SUBCUTANEOUS at 18:27

## 2020-07-05 RX ADMIN — FUROSEMIDE 40 MG: 10 INJECTION INTRAMUSCULAR; INTRAVENOUS at 10:34

## 2020-07-05 RX ADMIN — Medication 10 ML: at 21:47

## 2020-07-05 RX ADMIN — AMIODARONE HYDROCHLORIDE 0.5 MG/MIN: 50 INJECTION, SOLUTION INTRAVENOUS at 22:42

## 2020-07-05 RX ADMIN — AMIODARONE HYDROCHLORIDE 1 MG/MIN: 50 INJECTION, SOLUTION INTRAVENOUS at 10:35

## 2020-07-05 RX ADMIN — POTASSIUM CHLORIDE 40 MEQ: 20 TABLET, EXTENDED RELEASE ORAL at 16:41

## 2020-07-05 NOTE — ROUTINE PROCESS
Bedside and Verbal shift change report given to Edelmira Phillips RN (oncoming nurse) by self Sage crowley). Report included the following information SBAR, Kardex, Intake/Output, MAR, Recent Results

## 2020-07-05 NOTE — PROGRESS NOTES
Bedside verbal report given to Jasson Rodriguez RN. Amio, cardizem, and heparin gtt's infusing per MAR. Pt A fib on the monitor HR 's. Oncoming RN assuming care.

## 2020-07-05 NOTE — PROGRESS NOTES
[facility] CARDIOLOGY PROGRESS NOTE           7/5/2020 8:35 AM    Admit Date: 7/3/2020    Admit Diagnosis: Atrial fibrillation with rapid ventricular response (HCC) [I48.91]      Subjective:   No chest pain or shortness of breath, reports worsening leg/feet swelling    Interval History: (History of pertinent interval events obtained from nursing staff)    ROS:  GEN:  No fever or chills  Cardiovascular:  As noted above  Pulmonary:  As noted above  Neuro:  No new focal motor or sensory loss      Objective:     Vitals:    07/05/20 0005 07/05/20 0035 07/05/20 0507 07/05/20 0803   BP: 153/81  127/67 131/77   Pulse:  63 (!) 102 (!) 102   Resp:  18 18 19   Temp:  97.7 °F (36.5 °C) 97.6 °F (36.4 °C) 97.7 °F (36.5 °C)   SpO2:  92% 94% 95%   Weight:   92.3 kg (203 lb 8 oz)    Height:           Physical Exam:  [place]lo [place], Well Nourished, No Acute Distress, Alert & Oriented x 3, appropriate mood. Neck- supple, no JVD  CV- irreg irreg, tachycardic, no MRG  Lung- clear bilaterally  Abd- soft, nontender, nondistended  Ext- 1+ edema bilaterally.   Skin- warm and dry    Current Facility-Administered Medications   Medication Dose Route Frequency    potassium chloride (K-DUR, KLOR-CON) SR tablet 40 mEq  40 mEq Oral TID    apixaban (ELIQUIS) tablet 5 mg  5 mg Oral Q12H    dilTIAZem (CARDIZEM) 100 mg in 0.9% sodium chloride (MBP/ADV) 100 mL infusion  10 mg/hr IntraVENous TITRATE    sodium chloride (NS) flush 5-40 mL  5-40 mL IntraVENous Q8H    sodium chloride (NS) flush 5-40 mL  5-40 mL IntraVENous PRN    acetaminophen (TYLENOL) tablet 650 mg  650 mg Oral Q4H PRN    morphine injection 2 mg  2 mg IntraVENous Q4H PRN    naloxone (NARCAN) injection 0.4 mg  0.4 mg IntraVENous PRN    docusate sodium (COLACE) capsule 100 mg  100 mg Oral BID PRN    alum-mag hydroxide-simeth (MYLANTA) oral suspension 30 mL  30 mL Oral Q4H PRN    diphenhydrAMINE (BENADRYL) capsule 25 mg  25 mg Oral Q6H PRN     Data Review:   Recent Results (from the past 24 hour(s))   EKG, 12 LEAD, INITIAL    Collection Time: 07/04/20 10:56 AM   Result Value Ref Range    Ventricular Rate 88 BPM    Atrial Rate 88 BPM    P-R Interval 152 ms    QRS Duration 90 ms    Q-T Interval 406 ms    QTC Calculation (Bezet) 491 ms    Calculated R Axis -66 degrees    Calculated T Axis 95 degrees    Diagnosis       Sinus rhythm with Premature atrial complexes  Left anterior fascicular block  Left ventricular hypertrophy with repolarization abnormality  Prolonged QT  Abnormal ECG  When compared with ECG of 03-JUL-2020 16:17,  (previous EKG stated AF but appears similar with PACs)  Confirmed by Kristina New MD (), MARY ([id number]) on 7/5/2020 7:15:21 AM     GLUCOSE, POC    Collection Time: 07/04/20 11:21 PM   Result Value Ref Range    Glucose (POC) 102 (H) 65 - 980 mg/dL   METABOLIC PANEL, BASIC    Collection Time: 07/05/20  4:20 AM   Result Value Ref Range    Sodium 147 (H) 136 - 145 mmol/L    Potassium 3.1 (L) 3.5 - 5.1 mmol/L    Chloride 114 (H) 98 - 107 mmol/L    CO2 24 21 - 32 mmol/L    Anion gap 9 7 - 16 mmol/L    Glucose 104 (H) 65 - 100 mg/dL    BUN 12 8 - 23 MG/DL    Creatinine 0.87 0.8 - 1.5 MG/DL    GFR est AA >60 >60 ml/min/1.73m2    GFR est non-AA >60 >60 ml/min/1.73m2    Calcium 8.5 8.3 - 10.4 MG/DL   MAGNESIUM    Collection Time: 07/05/20  4:20 AM   Result Value Ref Range    Magnesium 2.2 1.8 - 2.4 mg/dL   CBC W/O DIFF    Collection Time: 07/05/20  4:20 AM   Result Value Ref Range    WBC 7.3 4.3 - 11.1 K/uL    RBC 4.11 (L) 4.23 - 5.6 M/uL    HGB 12.5 (L) 13.6 - 17.2 g/dL    HCT 39.7 (L) 41.1 - 50.3 %    MCV 96.6 79.6 - 97.8 FL    MCH 30.4 26.1 - 32.9 PG    MCHC 31.5 31.4 - 35.0 g/dL    RDW 15.0 (H) 11.9 - 14.6 %    PLATELET 244 (L) 698 - 450 K/uL    MPV 11.4 9.4 - 12.3 FL    ABSOLUTE NRBC 0.00 0.0 - 0.2 K/uL       EKG:  (EKG has been independently visualized by me with interpretation below)  Assessment:     Principal Problem:    Atrial fibrillation with rapid ventricular response ([facility] [address] (7/3/2020)    Active Problems:    Fatigue (7/3/2020)      SOB (shortness of breath) (7/3/2020)      Bilateral lower extremity edema (7/3/2020)      Hypokalemia (7/3/2020)    76 y.o.  male with PMHx of HTN, Anxiety and Thoracic Aortic Aneurysm who presented to the ED at [facility] today with c/o progressive weakness/fatigue and SOB that has worsened over the past month, and BLE edema and was noted to be in afib with RVR  Plan:   1. Atrial fibrillation, uncontrolled: Pt has had periods of NSR yesterday, will start amiodarone IV for attempt at rhythm control  2. Cardiomyopathy, new diagnosis: possibly tachy mediated, however, known history of vascular disease and patient reports months of chest pressure/indigestion, will plan for cardiac catheterization in the AM   3. CVA protection: will stop eliquis, transition back to heparin for upcoming cath  4. NIALL: will give dose of lasix today, normal creatinine  5. PPx: heparin    Amie Steiner MD  Cardiology/Electrophysiology

## 2020-07-05 NOTE — PROGRESS NOTES
Spoke with Ace Llamas in lab about PTT needing to be drawn, timed for 1630. Per [address]   will come to bedside to draw when available.

## 2020-07-06 LAB
ANION GAP SERPL CALC-SCNC: 8 MMOL/L (ref 7–16)
ANION GAP SERPL CALC-SCNC: 8 MMOL/L (ref 7–16)
APTT PPP: 63.4 SEC (ref 24.3–35.4)
BUN SERPL-MCNC: 12 MG/DL (ref 8–23)
BUN SERPL-MCNC: 12 MG/DL (ref 8–23)
CALCIUM SERPL-MCNC: 8.3 MG/DL (ref 8.3–10.4)
CALCIUM SERPL-MCNC: 8.5 MG/DL (ref 8.3–10.4)
CHLORIDE SERPL-SCNC: 113 MMOL/L (ref 98–107)
CHLORIDE SERPL-SCNC: 114 MMOL/L (ref 98–107)
CO2 SERPL-SCNC: 24 MMOL/L (ref 21–32)
CO2 SERPL-SCNC: 24 MMOL/L (ref 21–32)
CREAT SERPL-MCNC: 0.91 MG/DL (ref 0.8–1.5)
CREAT SERPL-MCNC: 1.08 MG/DL (ref 0.8–1.5)
ERYTHROCYTE [DISTWIDTH] IN BLOOD BY AUTOMATED COUNT: 14.9 % (ref 11.9–14.6)
GLUCOSE SERPL-MCNC: 101 MG/DL (ref 65–100)
GLUCOSE SERPL-MCNC: 138 MG/DL (ref 65–100)
HCT VFR BLD AUTO: 38.2 % (ref 41.1–50.3)
HGB BLD-MCNC: 11.9 G/DL (ref 13.6–17.2)
MAGNESIUM SERPL-MCNC: 2.2 MG/DL (ref 1.8–2.4)
MCH RBC QN AUTO: 30.4 PG (ref 26.1–32.9)
MCHC RBC AUTO-ENTMCNC: 31.2 G/DL (ref 31.4–35)
MCV RBC AUTO: 97.4 FL (ref 79.6–97.8)
NRBC # BLD: 0 K/UL (ref 0–0.2)
PLATELET # BLD AUTO: 136 K/UL (ref 150–450)
PMV BLD AUTO: 11.1 FL (ref 9.4–12.3)
POTASSIUM SERPL-SCNC: 3.1 MMOL/L (ref 3.5–5.1)
POTASSIUM SERPL-SCNC: 3.6 MMOL/L (ref 3.5–5.1)
RBC # BLD AUTO: 3.92 M/UL (ref 4.23–5.6)
SODIUM SERPL-SCNC: 145 MMOL/L (ref 136–145)
SODIUM SERPL-SCNC: 146 MMOL/L (ref 136–145)
WBC # BLD AUTO: 7.3 K/UL (ref 4.3–11.1)

## 2020-07-06 PROCEDURE — 77030016699 HC CATH ANGI DX INFN1 CARD -A

## 2020-07-06 PROCEDURE — 74011250637 HC RX REV CODE- 250/637: Performed by: INTERNAL MEDICINE

## 2020-07-06 PROCEDURE — 80048 BASIC METABOLIC PNL TOTAL CA: CPT

## 2020-07-06 PROCEDURE — 74011000258 HC RX REV CODE- 258: Performed by: INTERNAL MEDICINE

## 2020-07-06 PROCEDURE — 93458 L HRT ARTERY/VENTRICLE ANGIO: CPT

## 2020-07-06 PROCEDURE — 85027 COMPLETE CBC AUTOMATED: CPT

## 2020-07-06 PROCEDURE — 77030029997 HC DEV COM RDL R BND TELE -B

## 2020-07-06 PROCEDURE — 77030015766

## 2020-07-06 PROCEDURE — 99152 MOD SED SAME PHYS/QHP 5/>YRS: CPT

## 2020-07-06 PROCEDURE — 85730 THROMBOPLASTIN TIME PARTIAL: CPT

## 2020-07-06 PROCEDURE — 74011250636 HC RX REV CODE- 250/636: Performed by: NURSE PRACTITIONER

## 2020-07-06 PROCEDURE — 74011250636 HC RX REV CODE- 250/636: Performed by: INTERNAL MEDICINE

## 2020-07-06 PROCEDURE — 36415 COLL VENOUS BLD VENIPUNCTURE: CPT

## 2020-07-06 PROCEDURE — C1769 GUIDE WIRE: HCPCS

## 2020-07-06 PROCEDURE — 74011250637 HC RX REV CODE- 250/637: Performed by: NURSE PRACTITIONER

## 2020-07-06 PROCEDURE — 99153 MOD SED SAME PHYS/QHP EA: CPT

## 2020-07-06 PROCEDURE — 83735 ASSAY OF MAGNESIUM: CPT

## 2020-07-06 PROCEDURE — 74011636320 HC RX REV CODE- 636/320: Performed by: INTERNAL MEDICINE

## 2020-07-06 PROCEDURE — C1894 INTRO/SHEATH, NON-LASER: HCPCS

## 2020-07-06 PROCEDURE — 74011000250 HC RX REV CODE- 250: Performed by: INTERNAL MEDICINE

## 2020-07-06 PROCEDURE — 65660000000 HC RM CCU STEPDOWN

## 2020-07-06 RX ORDER — LIDOCAINE HYDROCHLORIDE 10 MG/ML
10-30 INJECTION, SOLUTION EPIDURAL; INFILTRATION; INTRACAUDAL; PERINEURAL ONCE
Status: COMPLETED | OUTPATIENT
Start: 2020-07-06 | End: 2020-07-06

## 2020-07-06 RX ORDER — POTASSIUM CHLORIDE 20 MEQ/1
40 TABLET, EXTENDED RELEASE ORAL ONCE
Status: COMPLETED | OUTPATIENT
Start: 2020-07-06 | End: 2020-07-06

## 2020-07-06 RX ORDER — SODIUM CHLORIDE 9 MG/ML
75 INJECTION, SOLUTION INTRAVENOUS CONTINUOUS
Status: DISCONTINUED | OUTPATIENT
Start: 2020-07-06 | End: 2020-07-06

## 2020-07-06 RX ORDER — FENTANYL CITRATE 50 UG/ML
25-50 INJECTION, SOLUTION INTRAMUSCULAR; INTRAVENOUS
Status: DISCONTINUED | OUTPATIENT
Start: 2020-07-06 | End: 2020-07-08 | Stop reason: HOSPADM

## 2020-07-06 RX ORDER — POTASSIUM CHLORIDE 29.8 MG/ML
40 INJECTION INTRAVENOUS ONCE
Status: DISCONTINUED | OUTPATIENT
Start: 2020-07-06 | End: 2020-07-06 | Stop reason: SDUPTHER

## 2020-07-06 RX ORDER — FUROSEMIDE 10 MG/ML
40 INJECTION INTRAMUSCULAR; INTRAVENOUS 2 TIMES DAILY
Status: DISCONTINUED | OUTPATIENT
Start: 2020-07-06 | End: 2020-07-08

## 2020-07-06 RX ORDER — CARVEDILOL 3.12 MG/1
3.12 TABLET ORAL 2 TIMES DAILY WITH MEALS
Status: DISCONTINUED | OUTPATIENT
Start: 2020-07-06 | End: 2020-07-08 | Stop reason: HOSPADM

## 2020-07-06 RX ORDER — HEPARIN SODIUM 10000 [USP'U]/ML
1000-10000 INJECTION, SOLUTION INTRAVENOUS; SUBCUTANEOUS
Status: DISCONTINUED | OUTPATIENT
Start: 2020-07-06 | End: 2020-07-08 | Stop reason: HOSPADM

## 2020-07-06 RX ORDER — GUAIFENESIN 100 MG/5ML
81 LIQUID (ML) ORAL ONCE
Status: COMPLETED | OUTPATIENT
Start: 2020-07-06 | End: 2020-07-06

## 2020-07-06 RX ORDER — HEPARIN SODIUM 200 [USP'U]/100ML
3 INJECTION, SOLUTION INTRAVENOUS CONTINUOUS
Status: DISCONTINUED | OUTPATIENT
Start: 2020-07-06 | End: 2020-07-07

## 2020-07-06 RX ORDER — POTASSIUM CHLORIDE 14.9 MG/ML
20 INJECTION INTRAVENOUS
Status: COMPLETED | OUTPATIENT
Start: 2020-07-06 | End: 2020-07-06

## 2020-07-06 RX ORDER — MIDAZOLAM HYDROCHLORIDE 1 MG/ML
.5-2 INJECTION, SOLUTION INTRAMUSCULAR; INTRAVENOUS
Status: DISCONTINUED | OUTPATIENT
Start: 2020-07-06 | End: 2020-07-08 | Stop reason: HOSPADM

## 2020-07-06 RX ADMIN — Medication 10 ML: at 14:05

## 2020-07-06 RX ADMIN — POTASSIUM CHLORIDE 40 MEQ: 20 TABLET, EXTENDED RELEASE ORAL at 21:41

## 2020-07-06 RX ADMIN — APIXABAN 5 MG: 5 TABLET, FILM COATED ORAL at 18:01

## 2020-07-06 RX ADMIN — POTASSIUM CHLORIDE 20 MEQ: 200 INJECTION, SOLUTION INTRAVENOUS at 16:00

## 2020-07-06 RX ADMIN — Medication 10 ML: at 21:42

## 2020-07-06 RX ADMIN — AMIODARONE HYDROCHLORIDE 1 MG/MIN: 50 INJECTION, SOLUTION INTRAVENOUS at 09:14

## 2020-07-06 RX ADMIN — HEPARIN SODIUM 3 UNITS/HR: 200 INJECTION, SOLUTION INTRAVENOUS at 11:34

## 2020-07-06 RX ADMIN — LIDOCAINE HYDROCHLORIDE 10 ML: 10 INJECTION, SOLUTION EPIDURAL; INFILTRATION; INTRACAUDAL; PERINEURAL at 11:34

## 2020-07-06 RX ADMIN — HEPARIN SODIUM 12 UNITS/KG/HR: 5000 INJECTION, SOLUTION INTRAVENOUS at 09:07

## 2020-07-06 RX ADMIN — MIDAZOLAM 2 MG: 1 INJECTION INTRAMUSCULAR; INTRAVENOUS at 11:42

## 2020-07-06 RX ADMIN — POTASSIUM CHLORIDE 20 MEQ: 200 INJECTION, SOLUTION INTRAVENOUS at 09:15

## 2020-07-06 RX ADMIN — SODIUM CHLORIDE 75 ML/HR: 9 INJECTION, SOLUTION INTRAVENOUS at 09:02

## 2020-07-06 RX ADMIN — FUROSEMIDE 40 MG: 10 INJECTION INTRAMUSCULAR; INTRAVENOUS at 18:02

## 2020-07-06 RX ADMIN — IOPAMIDOL 100 ML: 755 INJECTION, SOLUTION INTRAVENOUS at 12:04

## 2020-07-06 RX ADMIN — CARVEDILOL 3.12 MG: 3.12 TABLET, FILM COATED ORAL at 09:19

## 2020-07-06 RX ADMIN — CARVEDILOL 3.12 MG: 3.12 TABLET, FILM COATED ORAL at 16:21

## 2020-07-06 RX ADMIN — ASPIRIN 81 MG 81 MG: 81 TABLET ORAL at 11:31

## 2020-07-06 RX ADMIN — FENTANYL CITRATE 25 MCG: 50 INJECTION, SOLUTION INTRAMUSCULAR; INTRAVENOUS at 11:42

## 2020-07-06 NOTE — ROUTINE PROCESS
Bedside and Verbal shift change report given to self (oncoming nurse) by Sarah Ellis RN (offgoing nurse). Report included the following information SBAR, Kardex, ED Summary, Procedure Summary, Intake/Output, MAR, Recent Results

## 2020-07-06 NOTE — PROGRESS NOTES
NP Gladys Sampson notified of patient removing all IV's. She stated it was fine to leave the iv drips off right now. Will continue to monitor HR.

## 2020-07-06 NOTE — PROCEDURES
[facility]  CARDIAC CATH    Name:  Sameer Bueno  MR#:  [id number]  :  1945  ACCOUNT #:  [de-identified]  DATE OF SERVICE:  2020    PROCEDURES PERFORMED:  Left heart catheterization via the right radial artery with a 5-Algerian JL4, a 5-Algerian JR5, and angled pigtail. TR band was placed with good hemostasis. PREOPERATIVE DIAGNOSIS:  Cardiomyopathy. POSTOPERATIVE DIAGNOSIS:  Nonischemic cardiomyopathy. SURGEON:  August Block MD    ASSISTANT:  None. ESTIMATED BLOOD LOSS:  5 mL    SPECIMENS REMOVED:  None. COMPLICATIONS:  None. IMPLANTS:  None. ANESTHESIA:  Provided by Efrem Umaña RN, beginning at 10:40, concluding at 12:01. A total of 2 mg Versed, 25 mcg fentanyl were given. Vital signs and saturations were stable throughout. FINDINGS:  Left ventricle is normal size. Severely reduced LV systolic function. Ejection fraction is 30%. Left ventricular end-diastolic pressure is 15 mmHg with an opening aortic pressure of 118/75. There is no gradient across the aortic valve. Left main coronary artery is in normal anatomic position, free of significant disease. Bifurcates into LAD and circumflex systems. The LAD has a moderate-sized proximal diagonal branch that bifurcates distally. The LAD has mild irregularities in the mid and distal section up to 20% and a small vessel coursing short of the apex. The circumflex terminates into a distal obtuse marginal branch with a 20% midvessel irregularity. The right coronary artery has a large fishman's crook takeoff, mild irregularities up to 10% to 20%, and a small aneurysmal outpouching in the proximal segment of the vessel that is non-flow limiting. The PDA and posterolateral obtuse marginal branches are free of any high-grade narrowing. 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       [facility] MD [id number]  D:  07/06/2020 12:14  T:  07/06/2020 13:36  JOB #:  [id number]

## 2020-07-06 NOTE — PROGRESS NOTES
7/6/2020 9:29 AM    Admit Date: 7/3/2020    Admit Diagnosis: Atrial fibrillation with rapid ventricular response (HCC) [I48.91]      Subjective:   Confused overnight better now- no cp or sob      Objective:      Visit Vitals  /83 (BP 1 Location: Right arm, BP Patient Position: At rest)   Pulse 71   Temp 97.7 °F (36.5 °C)   Resp 16   Ht 6' (1.829 m)   Wt 90.7 kg (199 lb 14.4 oz)   SpO2 93%   BMI 27.11 kg/m²       Physical Exam:  Sheila [place], Well Nourished, No Acute Distress, Alert & Oriented x 3, appropriate mood. Neck- supple, no JVD  CV- regular rate and rhythm no MRG  Lung- clear bilaterally  Abd- soft, nontender, nondistended  Ext- no edema bilaterally. Skin- warm and dry        Data Review:   Recent Labs     07/06/20  0349   *   K 3.1*   BUN 12   CREA 0.91   WBC 7.3   HGB 11.9*   HCT 38.2*   *       Assessment/Plan:     Principal Problem:    Atrial fibrillation with rapid ventricular response (HCC) (7/3/2020)intermittent nsr- replete K    Active Problems:    Fatigue (7/3/2020)      SOB (shortness of breath) (7/3/2020)      Bilateral lower extremity edema (7/3/2020)      Hypokalemia (6/1/7442)  Chronic systolic chf- stable- add coreg-  worse. Left heart catheterization with possible angioplasty and alternative therapies discussed. Risks and benefits of the procedure including bleeding, arterial injury, infection, MI, stoke, death, emergent cabg, acute renal failure, contrast allergic reaction were discussed and questions answered.

## 2020-07-06 NOTE — PROGRESS NOTES
TRANSFER - OUT REPORT:    Verbal report given to cpru RN(name) on Haroon Smith  being transferred to cpru(unit) for routine progression of care       Report consisted of patients Situation, Background, Assessment and   Recommendations(SBAR). Information from the following report(s) SBAR, Kardex, Procedure Summary and MAR was reviewed with the receiving nurse. Lines:   Peripheral IV 07/06/20 Right Forearm (Active)   Site Assessment Clean, dry, & intact 7/6/2020  8:50 AM   Phlebitis Assessment 0 7/6/2020  8:50 AM   Infiltration Assessment 0 7/6/2020  8:50 AM   Dressing Status Clean, dry, & intact 7/6/2020  8:50 AM   Dressing Type Tape;Transparent 7/6/2020  8:50 AM   Hub Color/Line Status Infusing 7/6/2020  8:50 AM   Alcohol Cap Used No 7/6/2020  8:50 AM       Peripheral IV 07/06/20 Left Hand (Active)   Site Assessment Clean, dry, & intact 7/6/2020  8:50 AM   Phlebitis Assessment 0 7/6/2020  8:50 AM   Infiltration Assessment 0 7/6/2020  8:50 AM   Dressing Status Clean, dry, & intact 7/6/2020  8:50 AM   Dressing Type Tape;Transparent 7/6/2020  8:50 AM   Hub Color/Line Status Infusing 7/6/2020  8:50 AM   Alcohol Cap Used No 7/6/2020  8:50 AM        Opportunity for questions and clarification was provided.       Patient transported with:   [facility] with Dr. Nina Arguello  Right radial access  No intervention  Versed 2mg  Fentanyl 25mcg  TR Band in place at 1200 With 11ml ml in the band

## 2020-07-06 NOTE — ROUTINE PROCESS
TRANSFER - IN REPORT: 
 
Verbal report received from Abby Carpio on Good Parkinson being received from [facility] for routine progression of care. Report consisted of patients Situation, Background, Assessment and Recommendations(SBAR). Information from the following report(s) Procedure Summary was reviewed. Opportunity for questions and clarification was provided. Assessment completed upon patients arrival to unit and care assumed. Patient received to room 324. Patient connected to monitor and assessment completed. Plan of care reviewed. Patient oriented to room and call light. Patient aware to use call light to communicate any chest pain or needs. Admission skin assessment completed with second RN and reveals the following: Patient has a right radial site s/p LHC. No other new skin abnormalities noted.

## 2020-07-06 NOTE — PROGRESS NOTES
Orders from Dr. Ana María Vergara to restart amio, heparin, do not start back cardizem and give NS at 75.

## 2020-07-06 NOTE — ROUTINE PROCESS
Bedside and Verbal shift change report given to self (oncoming nurse) by Nancy Oden (offgoing nurse). Report included the following information SBAR, Kardex, MAR and Recent Results.

## 2020-07-06 NOTE — PROGRESS NOTES
TRANSFER - OUT REPORT:    Verbal report given to Tanvir Morris (name) on Ben Farr  Returning to room 324 (unit) for routine progression of care       Report consisted of patients Situation, Background, Assessment and   Recommendations(SBAR). Information from the following report(s) Procedure Summary was reviewed with the receiving nurse. Lines:   Peripheral IV 07/06/20 Right Forearm (Active)   Site Assessment Clean, dry, & intact 7/6/2020  8:50 AM   Phlebitis Assessment 0 7/6/2020  8:50 AM   Infiltration Assessment 0 7/6/2020  8:50 AM   Dressing Status Clean, dry, & intact 7/6/2020  8:50 AM   Dressing Type Tape;Transparent 7/6/2020  8:50 AM   Hub Color/Line Status Infusing 7/6/2020  8:50 AM   Alcohol Cap Used No 7/6/2020  8:50 AM       Peripheral IV 07/06/20 Left Hand (Active)   Site Assessment Clean, dry, & intact 7/6/2020  8:50 AM   Phlebitis Assessment 0 7/6/2020  8:50 AM   Infiltration Assessment 0 7/6/2020  8:50 AM   Dressing Status Clean, dry, & intact 7/6/2020  8:50 AM   Dressing Type Tape;Transparent 7/6/2020  8:50 AM   Hub Color/Line Status Infusing 7/6/2020  8:50 AM   Alcohol Cap Used No 7/6/2020  8:50 AM        Opportunity for questions and clarification was provided.       Patient transported with:   [address] transport

## 2020-07-06 NOTE — PROCEDURES
Brief Cardiac Procedure Note    Patient: Inez Mcdaniel MRN: [id number]  SSN: [id number]    YOB: 1945  Age: 76 y.o. Sex: male      Date of Procedure: 7/6/2020     Pre-procedure Diagnosis: Congestive Heart Failure    Post-procedure Diagnosis: Congestive Heart Failure    Reason for Procedure: Cardiomyopathy    Procedure: Left Heart Catheterization    Brief Description of Procedure: lhc via right radial, tr    Performed By: Lexx Hernandez MD     Assistants: none    Anesthesia: Moderate Sedation    Estimated Blood Loss: Less than 10 mL      Specimens: None    Implants: None    Findings: mild cad, ef 30    Complications: None    Recommendations: Continue medical therapy.     Signed By: Lexx Hernandez MD     July 6, 2020

## 2020-07-06 NOTE — PROGRESS NOTES
Report received from SABION Polanco. Procedural findings communicated. Intra procedural  medication administration reviewed. Progression of care discussed.      Patient received into [facility] [place] [facility] post sheath removal.     right Radial access site without bleeding or swelling     TR band dry and intact     Patient instructed to limit movement to right upper extremity    Routine post procedural vital signs and site assessment initiated

## 2020-07-06 NOTE — ROUTINE PROCESS
Bedside and Verbal shift change report will be given to Cesar Hairston (oncoming nurse) by self (offgoing nurse). Report included the following information SBAR, Kardex, MAR and Recent Results. amio running at 0.5; BP cycling on the eagle hourly

## 2020-07-06 NOTE — ROUTINE PROCESS
Bedside report received from Dorcas Nguyen RN. Heparin gtt verified at bedside. Amio is infusing with a filter present.   
Cardizem is infusing at 10

## 2020-07-06 NOTE — PROGRESS NOTES
Patient returned to room 324 by hospital transport and cpru rn. Jesse Nguyen RN present at bedside to received patient.  Right wrist remains intact with no bleeding or hematoma

## 2020-07-07 LAB
ANION GAP SERPL CALC-SCNC: 8 MMOL/L (ref 7–16)
BUN SERPL-MCNC: 16 MG/DL (ref 8–23)
CALCIUM SERPL-MCNC: 8.5 MG/DL (ref 8.3–10.4)
CHLORIDE SERPL-SCNC: 112 MMOL/L (ref 98–107)
CO2 SERPL-SCNC: 25 MMOL/L (ref 21–32)
CREAT SERPL-MCNC: 1.17 MG/DL (ref 0.8–1.5)
GLUCOSE SERPL-MCNC: 113 MG/DL (ref 65–100)
MAGNESIUM SERPL-MCNC: 2.2 MG/DL (ref 1.8–2.4)
POTASSIUM SERPL-SCNC: 3.4 MMOL/L (ref 3.5–5.1)
SODIUM SERPL-SCNC: 145 MMOL/L (ref 136–145)

## 2020-07-07 PROCEDURE — 74011000258 HC RX REV CODE- 258: Performed by: INTERNAL MEDICINE

## 2020-07-07 PROCEDURE — 74011250636 HC RX REV CODE- 250/636: Performed by: INTERNAL MEDICINE

## 2020-07-07 PROCEDURE — 74011250637 HC RX REV CODE- 250/637: Performed by: INTERNAL MEDICINE

## 2020-07-07 PROCEDURE — 83735 ASSAY OF MAGNESIUM: CPT

## 2020-07-07 PROCEDURE — 65660000000 HC RM CCU STEPDOWN

## 2020-07-07 PROCEDURE — 36415 COLL VENOUS BLD VENIPUNCTURE: CPT

## 2020-07-07 PROCEDURE — 80048 BASIC METABOLIC PNL TOTAL CA: CPT

## 2020-07-07 PROCEDURE — 74011250636 HC RX REV CODE- 250/636: Performed by: NURSE PRACTITIONER

## 2020-07-07 RX ORDER — LISINOPRIL 5 MG/1
2.5 TABLET ORAL DAILY
Status: DISCONTINUED | OUTPATIENT
Start: 2020-07-07 | End: 2020-07-08 | Stop reason: HOSPADM

## 2020-07-07 RX ORDER — POTASSIUM CHLORIDE 14.9 MG/ML
20 INJECTION INTRAVENOUS
Status: COMPLETED | OUTPATIENT
Start: 2020-07-07 | End: 2020-07-07

## 2020-07-07 RX ORDER — POTASSIUM CHLORIDE 20 MEQ/1
40 TABLET, EXTENDED RELEASE ORAL
Status: COMPLETED | OUTPATIENT
Start: 2020-07-07 | End: 2020-07-07

## 2020-07-07 RX ORDER — AMIODARONE HYDROCHLORIDE 200 MG/1
400 TABLET ORAL 2 TIMES DAILY
Status: DISCONTINUED | OUTPATIENT
Start: 2020-07-07 | End: 2020-07-08 | Stop reason: HOSPADM

## 2020-07-07 RX ORDER — POTASSIUM CHLORIDE 29.8 MG/ML
40 INJECTION INTRAVENOUS ONCE
Status: DISCONTINUED | OUTPATIENT
Start: 2020-07-07 | End: 2020-07-07 | Stop reason: DRUGHIGH

## 2020-07-07 RX ADMIN — FUROSEMIDE 40 MG: 10 INJECTION INTRAMUSCULAR; INTRAVENOUS at 18:08

## 2020-07-07 RX ADMIN — AMIODARONE HYDROCHLORIDE 0.5 MG/MIN: 50 INJECTION, SOLUTION INTRAVENOUS at 08:54

## 2020-07-07 RX ADMIN — POTASSIUM CHLORIDE 20 MEQ: 200 INJECTION, SOLUTION INTRAVENOUS at 14:05

## 2020-07-07 RX ADMIN — Medication 10 ML: at 21:36

## 2020-07-07 RX ADMIN — APIXABAN 5 MG: 5 TABLET, FILM COATED ORAL at 18:08

## 2020-07-07 RX ADMIN — POTASSIUM CHLORIDE 40 MEQ: 20 TABLET, EXTENDED RELEASE ORAL at 08:56

## 2020-07-07 RX ADMIN — FUROSEMIDE 40 MG: 10 INJECTION INTRAMUSCULAR; INTRAVENOUS at 08:58

## 2020-07-07 RX ADMIN — CARVEDILOL 3.12 MG: 3.12 TABLET, FILM COATED ORAL at 08:57

## 2020-07-07 RX ADMIN — AMIODARONE HYDROCHLORIDE 400 MG: 200 TABLET ORAL at 18:07

## 2020-07-07 RX ADMIN — POTASSIUM CHLORIDE 20 MEQ: 200 INJECTION, SOLUTION INTRAVENOUS at 11:15

## 2020-07-07 RX ADMIN — APIXABAN 5 MG: 5 TABLET, FILM COATED ORAL at 08:57

## 2020-07-07 RX ADMIN — Medication 10 ML: at 06:11

## 2020-07-07 RX ADMIN — AMIODARONE HYDROCHLORIDE 400 MG: 200 TABLET ORAL at 11:12

## 2020-07-07 RX ADMIN — Medication 5 ML: at 14:05

## 2020-07-07 RX ADMIN — CARVEDILOL 3.12 MG: 3.12 TABLET, FILM COATED ORAL at 18:07

## 2020-07-07 RX ADMIN — LISINOPRIL 2.5 MG: 5 TABLET ORAL at 08:56

## 2020-07-07 NOTE — PROGRESS NOTES
7/7/2020 10:15 AM    Admit Date: 7/3/2020    Admit Diagnosis: Atrial fibrillation with rapid ventricular response (HCC) [I48.91]      Subjective:   No cp or sob- feels better      Objective:      Visit Vitals  /70 (BP 1 Location: Left arm, BP Patient Position: At rest) Comment: Simultaneous filing. User may not have seen previous data. Pulse 67 Comment: Simultaneous filing. User may not have seen previous data. Temp 98 °F (36.7 °C)   Resp 17   Ht 6' (1.829 m)   Wt 89.8 kg (198 lb)   SpO2 93%   BMI 26.85 kg/m²       Physical Exam:  Danette [place], Well Nourished, No Acute Distress, Alert & Oriented x 3, appropriate mood. Neck- supple, no JVD  CV- regular rate and rhythm no MRG  Lung- clear bilaterally  Abd- soft, nontender, nondistended  Ext- no edema bilaterally. Skin- warm and dry        Data Review:   Recent Labs     07/07/20  0422  07/06/20  0349      < > 146*   K 3.4*   < > 3.1*   BUN 16   < > 12   CREA 1.17   < > 0.91   WBC  --   --  7.3   HGB  --   --  11.9*   HCT  --   --  38.2*   PLT  --   --  136*    < > = values in this interval not displayed. Assessment/Plan:     Principal Problem:    Atrial fibrillation with rapid ventricular response ([address] (7/3/2020)- in nsr- change to po amio.  Replete k    Active Problems:    Fatigue (7/3/2020)      SOB (shortness of breath) (7/3/2020)- better with iv lasix- bmp on am      Bilateral lower extremity edema (7/3/2020)      Hypokalemia (7/3/2020)

## 2020-07-07 NOTE — PROGRESS NOTES
Care Management Interventions  PCP Verified by CM: Yes  Last Visit to PCP: 07/02/20  Mode of Transport at Discharge: Other (see comment)(estella presley Spouse   [phone] )  Transition of Care Consult (CM Consult): Discharge Planning  Discharge Durable Medical Equipment: No  Physical Therapy Consult: No  Occupational Therapy Consult: No  Speech Therapy Consult: No  Current Support Network: Lives with Spouse, Own Home  Confirm Follow Up Transport: Family  Discharge Location  Discharge Placement: Home    Pt admitted to 3rd floor [facility] for  afib. CM met with pt to discuss CM needs & DCP. Pt is A&Ox4. Pt is indep at home with all ADLS. Pt lives with spouse. Pt has no DME needs. Pt has no difficulty with obtaining medications or transport. DCP home with spouse. No further needs noted. CM to continue to monitor.

## 2020-07-07 NOTE — ROUTINE PROCESS
Bedside and Verbal shift change report given to Soniya Fry RN (oncoming nurse) by self (offgoing nurse). Report included the following information SBAR, Kardex, Intake/Output, MAR, Recent Results

## 2020-07-07 NOTE — ROUTINE PROCESS
Bedside and Verbal shift change report given to Raina Lay (oncoming nurse) by self (offgoing nurse). Report included the following information SBAR, Kardex, MAR and Recent Results.

## 2020-07-07 NOTE — ROUTINE PROCESS
Bedside and Verbal shift change report given to self (oncoming nurse) by Isa Christianson (offgoing nurse). Report included the following information SBAR, Kardex, MAR and Recent Results. amio running at 0.5; BP cycling on the eagle hourly

## 2020-07-08 VITALS
HEART RATE: 64 BPM | BODY MASS INDEX: 26.97 KG/M2 | WEIGHT: 199.1 LBS | HEIGHT: 72 IN | OXYGEN SATURATION: 94 % | TEMPERATURE: 97.9 F | RESPIRATION RATE: 16 BRPM | SYSTOLIC BLOOD PRESSURE: 110 MMHG | DIASTOLIC BLOOD PRESSURE: 67 MMHG

## 2020-07-08 LAB
ANION GAP SERPL CALC-SCNC: 9 MMOL/L (ref 7–16)
BUN SERPL-MCNC: 22 MG/DL (ref 8–23)
CALCIUM SERPL-MCNC: 8.4 MG/DL (ref 8.3–10.4)
CHLORIDE SERPL-SCNC: 110 MMOL/L (ref 98–107)
CO2 SERPL-SCNC: 24 MMOL/L (ref 21–32)
CREAT SERPL-MCNC: 1.33 MG/DL (ref 0.8–1.5)
GLUCOSE SERPL-MCNC: 96 MG/DL (ref 65–100)
MAGNESIUM SERPL-MCNC: 2.2 MG/DL (ref 1.8–2.4)
POTASSIUM SERPL-SCNC: 3.4 MMOL/L (ref 3.5–5.1)
SODIUM SERPL-SCNC: 143 MMOL/L (ref 136–145)

## 2020-07-08 PROCEDURE — 80048 BASIC METABOLIC PNL TOTAL CA: CPT

## 2020-07-08 PROCEDURE — 36415 COLL VENOUS BLD VENIPUNCTURE: CPT

## 2020-07-08 PROCEDURE — 74011250637 HC RX REV CODE- 250/637: Performed by: INTERNAL MEDICINE

## 2020-07-08 PROCEDURE — 83735 ASSAY OF MAGNESIUM: CPT

## 2020-07-08 RX ORDER — FUROSEMIDE 40 MG/1
40 TABLET ORAL DAILY
Status: DISCONTINUED | OUTPATIENT
Start: 2020-07-08 | End: 2020-07-08 | Stop reason: HOSPADM

## 2020-07-08 RX ORDER — FUROSEMIDE 40 MG/1
40 TABLET ORAL DAILY
Qty: 30 TAB | Refills: 11 | Status: SHIPPED | OUTPATIENT
Start: 2020-07-08 | End: 2020-10-22 | Stop reason: SDUPTHER

## 2020-07-08 RX ORDER — LISINOPRIL 2.5 MG/1
2.5 TABLET ORAL DAILY
Qty: 30 TAB | Refills: 11 | Status: SHIPPED | OUTPATIENT
Start: 2020-07-09 | End: 2020-07-14 | Stop reason: SDUPTHER

## 2020-07-08 RX ORDER — AMIODARONE HYDROCHLORIDE 200 MG/1
TABLET ORAL
Qty: 52 TAB | Refills: 0 | Status: SHIPPED | OUTPATIENT
Start: 2020-07-08 | End: 2020-07-28

## 2020-07-08 RX ORDER — POTASSIUM CHLORIDE 20 MEQ/1
40 TABLET, EXTENDED RELEASE ORAL DAILY
Status: DISCONTINUED | OUTPATIENT
Start: 2020-07-08 | End: 2020-07-08 | Stop reason: HOSPADM

## 2020-07-08 RX ORDER — AMIODARONE HYDROCHLORIDE 200 MG/1
200 TABLET ORAL DAILY
Qty: 30 TAB | Refills: 11 | Status: SHIPPED | OUTPATIENT
Start: 2020-07-28 | End: 2020-10-22 | Stop reason: SDUPTHER

## 2020-07-08 RX ORDER — AMIODARONE HYDROCHLORIDE 200 MG/1
TABLET ORAL
Qty: 412 TAB | Refills: 0 | Status: SHIPPED | OUTPATIENT
Start: 2020-07-08 | End: 2020-07-08 | Stop reason: SDUPTHER

## 2020-07-08 RX ORDER — CARVEDILOL 3.12 MG/1
3.12 TABLET ORAL 2 TIMES DAILY WITH MEALS
Qty: 60 TAB | Refills: 11 | Status: SHIPPED | OUTPATIENT
Start: 2020-07-08 | End: 2020-07-14 | Stop reason: SDUPTHER

## 2020-07-08 RX ORDER — POTASSIUM CHLORIDE 20 MEQ/1
40 TABLET, EXTENDED RELEASE ORAL DAILY
Qty: 30 TAB | Refills: 11 | Status: SHIPPED | OUTPATIENT
Start: 2020-07-09 | End: 2020-10-22 | Stop reason: SDUPTHER

## 2020-07-08 RX ORDER — POTASSIUM CHLORIDE 20 MEQ/1
40 TABLET, EXTENDED RELEASE ORAL
Status: COMPLETED | OUTPATIENT
Start: 2020-07-08 | End: 2020-07-08

## 2020-07-08 RX ADMIN — APIXABAN 5 MG: 5 TABLET, FILM COATED ORAL at 08:31

## 2020-07-08 RX ADMIN — POTASSIUM CHLORIDE 40 MEQ: 20 TABLET, EXTENDED RELEASE ORAL at 08:40

## 2020-07-08 RX ADMIN — FUROSEMIDE 40 MG: 40 TABLET ORAL at 08:30

## 2020-07-08 RX ADMIN — CARVEDILOL 3.12 MG: 3.12 TABLET, FILM COATED ORAL at 08:31

## 2020-07-08 RX ADMIN — LISINOPRIL 2.5 MG: 5 TABLET ORAL at 08:29

## 2020-07-08 RX ADMIN — AMIODARONE HYDROCHLORIDE 400 MG: 200 TABLET ORAL at 08:31

## 2020-07-08 RX ADMIN — POTASSIUM CHLORIDE 40 MEQ: 20 TABLET, EXTENDED RELEASE ORAL at 08:28

## 2020-07-08 RX ADMIN — Medication 10 ML: at 05:30

## 2020-07-08 NOTE — DISCHARGE INSTRUCTIONS
DISPOSITION: The patient is being discharged home in stable condition on a low saturated fat, low cholesterol and low salt diet. The patient is instructed to advance activities as tolerated to the limit of fatigue or shortness of breath. The patient is instructed to avoid all heavy lifting, straining, stooping or squatting for 3-5 days. The patient is instructed to watch the cath site for bleeding/oozing; if seen, the patient is instructed to apply firm pressure with a clean cloth and call [facility] at [phone]. The patient is instructed to watch for signs of infection which include: increasing area of redness, fever/hot to touch or purulent drainage at the catheterization site. The patient is instructed not to soak in a bathtub for 7-10 days, but is cleared to shower. The patient is instructed to call the office or return to the ER for immediate evaluation for any shortness of breath or chest pain not relieved by NTG. Atrial Fibrillation: Care Instructions  Your Care Instructions     Atrial fibrillation is an irregular and often fast heartbeat. Treating this condition is important for several reasons. It can cause blood clots, which can travel from your heart to your brain and cause a stroke. If you have a fast heartbeat, you may feel lightheaded, dizzy, and weak. An irregular heartbeat can also increase your risk for heart failure. Atrial fibrillation is often the result of another heart condition, such as high blood pressure or coronary artery disease. Making changes to improve your heart condition will help you stay healthy and active. Follow-up care is a key part of your treatment and safety. Be sure to make and go to all appointments, and call your doctor if you are having problems. It's also a good idea to know your test results and keep a list of the medicines you take. How can you care for yourself at home? Medicines  · Take your medicines exactly as prescribed.  Call your doctor if you think you are having a problem with your medicine. You will get more details on the specific medicines your doctor prescribes. · If your doctor has given you a blood thinner to prevent a stroke, be sure you get instructions about how to take your medicine safely. Blood thinners can cause serious bleeding problems. · Do not take any vitamins, over-the-counter drugs, or herbal products without talking to your doctor first.  Lifestyle changes  · Do not smoke. Smoking can increase your chance of a stroke and heart attack. If you need help quitting, talk to your doctor about stop-smoking programs and medicines. These can increase your chances of quitting for good. · Eat a heart-healthy diet. · Stay at a healthy weight. Lose weight if you need to. · Limit alcohol to 2 drinks a day for men and 1 drink a day for women. Too much alcohol can cause health problems. · Avoid colds and flu. Get a pneumococcal vaccine shot. If you have had one before, ask your doctor whether you need another dose. Get a flu shot every year. If you must be around people with colds or flu, wash your hands often. Activity  · If your doctor recommends it, get more exercise. Walking is a good choice. Bit by bit, increase the amount you walk every day. Try for at least 30 minutes on most days of the week. You also may want to swim, bike, or do other activities. Your doctor may suggest that you join a cardiac rehabilitation program so that you can have help increasing your physical activity safely. · Start light exercise if your doctor says it is okay. Even a small amount will help you get stronger, have more energy, and manage stress. Walking is an easy way to get exercise. Start out by walking a little more than you did in the hospital. Gradually increase the amount you walk. · When you exercise, watch for signs that your heart is working too hard. You are pushing too hard if you cannot talk while you are exercising.  If you become short of breath or dizzy or have chest pain, sit down and rest immediately. · Check your pulse regularly. Place two fingers on the artery at the palm side of your wrist, in line with your thumb. If your heartbeat seems uneven or fast, talk to your doctor. When should you call for help? [id number] anytime you think you may need emergency care. For example, call if:  · You have symptoms of a heart attack. These may include:  ? Chest pain or pressure, or a strange feeling in the chest.  ? Sweating. ? Shortness of breath. ? Nausea or vomiting. ? Pain, pressure, or a strange feeling in the back, neck, jaw, or upper belly or in one or both shoulders or arms. ? Lightheadedness or sudden weakness. ? A fast or irregular heartbeat. After you call 911, the  may tell you to chew 1 adult-strength or 2 to 4 low-dose aspirin. Wait for an ambulance. Do not try to drive yourself. · You have symptoms of a stroke. These may include:  ? Sudden numbness, tingling, weakness, or loss of movement in your face, arm, or leg, especially on only one side of your body. ? Sudden vision changes. ? Sudden trouble speaking. ? Sudden confusion or trouble understanding simple statements. ? Sudden problems with walking or balance. ? A sudden, severe headache that is different from past headaches. · You passed out (lost consciousness). Call your doctor now or seek immediate medical care if:  · You have new or increased shortness of breath. · You feel dizzy or lightheaded, or you feel like you may faint. · Your heart rate becomes irregular. · You can feel your heart flutter in your chest or skip heartbeats. Tell your doctor if these symptoms are new or worse. Watch closely for changes in your health, and be sure to contact your doctor if you have any problems. Where can you learn more?   Go to http://www.gray.com/  Enter U020 in the search box to learn more about \"Atrial Fibrillation: Care Instructions. \"  Current as of: December 16, 2019               Content Version: 12.5  © 5582-2830 .com. Care instructions adapted under license by [employer] (which disclaims liability or warranty for this information). If you have questions about a medical condition or this instruction, always ask your healthcare professional. [address] any warranty or liability for your use of this information. Left Heart Catheterization: About This Test  What is it? Left heart catheterization is a test to check the left side of your heart. Your doctor might look at the shape of your heart, the motion of your heart, or the blood pressure inside the chambers. Why is this test done? This test gives information about how your heart is working. It can:  · Check blood flow and blood pressure in the chambers of the heart. · Check the pumping action of the heart. · Find out if a heart defect is present and how severe it is. · Find out how well the heart valves work. How is the test done? · You will get medicine to help you relax. · A thin tube called a catheter is put into a blood vessel in the groin or the arm. The doctor moves the catheter through the blood vessel into your heart. · You will get a shot to numb the skin where the catheter goes in. · Dye may be injected into your heart. Your doctor can watch on special monitors as the dye moves in your heart. The dye helps your doctor see blood flow in your heart. · If a heart defect is found, cardiac catheterization sometimes is used to correct it during the test.  · You will stay in a room for at least a few hours to make sure the catheter site starts to heal. You may have a bandage or a compression device on your groin or arm to prevent bleeding. · If the catheter was placed in your groin, you may lie in bed for a few hours.  If the catheter was put in your arm, you will need to keep your arm still for at least 1 hour. How long does it take? The test will take about 30 minutes. If a problem is found and the doctor treats it, the test can take a few hours longer. What happens after the test?  · You may or may not need to stay in the hospital overnight. You will get more instructions for what to do at home. · Drink plenty of fluids for several hours after the test.  Follow-up care is a key part of your treatment and safety. Be sure to make and go to all appointments, and call your doctor if you are having problems. It's also a good idea to know your test results and keep a list of the medicines you take. Where can you learn more? Go to http://www.gray.com/  Enter W306 in the search box to learn more about \"Left Heart Catheterization: About This Test.\"  Current as of: December 16, 2019               Content Version: 12.5  © 8256-9338 [employer]. Care instructions adapted under license by [employer] (which disclaims liability or warranty for this information). If you have questions about a medical condition or this instruction, always ask your healthcare professional. [place] [address] any warranty or liability for your use of this information. DISCHARGE SUMMARY from Nurse    PATIENT INSTRUCTIONS:    After general anesthesia or intravenous sedation, for 24 hours or while taking prescription Narcotics:  · Limit your activities  · Do not drive and operate hazardous machinery  · Do not make important personal or business decisions  · Do  not drink alcoholic beverages  · If you have not urinated within 8 hours after discharge, please contact your surgeon on call.     Report the following to your surgeon:  · Excessive pain, swelling, redness or odor of or around the surgical area  · Temperature over 100.5  · Nausea and vomiting lasting longer than 4 hours or if unable to take medications  · Any signs of decreased circulation or nerve impairment to extremity: change in color, persistent  numbness, tingling, coldness or increase pain  · Any questions    What to do at Home:  Recommended activity: Activity as tolerated and no driving for 3-5 days. If you experience any of the following symptoms chest paint, shortness of breath, palpitations please follow up with [address] [place] [address] [address] [facility]. *  Please give a list of your current medications to your Primary Care Provider. *  Please update this list whenever your medications are discontinued, doses are      changed, or new medications (including over-the-counter products) are added. *  Please carry medication information at all times in case of emergency situations. These are general instructions for a healthy lifestyle:    No smoking/ No tobacco products/ Avoid exposure to second hand smoke  Surgeon General's Warning:  Quitting smoking now greatly reduces serious risk to your health. Obesity, smoking, and sedentary lifestyle greatly increases your risk for illness    A healthy diet, regular physical exercise & weight monitoring are important for maintaining a healthy lifestyle    You may be retaining fluid if you have a history of heart failure or if you experience any of the following symptoms:  Weight gain of 3 pounds or more overnight or 5 pounds in a week, increased swelling in our hands or feet or shortness of breath while lying flat in bed. Please call your doctor as soon as you notice any of these symptoms; do not wait until your next office visit. The discharge information has been reviewed with the patient. The patient verbalized understanding. Discharge medications reviewed with the patient and appropriate educational materials and side effects teaching were provided.   ___________________________________________________________________________________________________________________________________

## 2020-07-08 NOTE — ROUTINE PROCESS
Bedside and Verbal report given to self by Raina Lay.  Report included SBAR, Kardex, ED Summary, Procedure Summary, Intake and Output and Cardiac Rhythm SR.

## 2020-07-08 NOTE — ROUTINE PROCESS
Discharge instructions reviewed with ***. Prescriptions given for *** and med info sheets provided for all new medications. Opportunity for questions provided. *** voiced understanding of all discharge instructions.

## 2020-07-08 NOTE — PROGRESS NOTES
Care Management Interventions  PCP Verified by CM: Yes  Last Visit to PCP: 07/02/20  Mode of Transport at Discharge:  Other (see comment)(estella presley Spouse   [phone] )  Transition of Care Consult (CM Consult): Discharge Planning  Discharge Durable Medical Equipment: No  Physical Therapy Consult: No  Occupational Therapy Consult: No  Speech Therapy Consult: No  Current Support Network: Lives with Spouse, Own Home  Confirm Follow Up Transport: Family  Discharge Location  Discharge Placement: Home

## 2020-07-08 NOTE — DISCHARGE SUMMARY
[facility] Cardiology Discharge Summary     Patient ID:  Pippa Nino  [id number]  46 y.o.  1945    Admit date: 7/3/2020    Discharge date:  07/08/2020    Admitting Physician: Joon Griffin MD     Discharge Physician: Dulce Dent NP/Dr. Darion Sol    Admission Diagnoses: Atrial fibrillation with rapid ventricular response [facility]) [I48.91]    Discharge Diagnoses:    Diagnosis    Atrial fibrillation with rapid ventricular response ([address]    Fatigue    SOB (shortness of breath)    Bilateral lower extremity edema    Hypokalemia       Cardiology Procedures this admission:  Diagnostic left heart catheterization  EchoCardiogram  Consults: None    Hospital Course: Patient presented to the ER with c/o progressive weakness/fatigue and shortness of breath. He also noted BLE edema. Patient had initially presented to Urgent care for COVID testing and was found to be tachycardic. Patient was sent to the ED for further evaluation. EKG showed a fib with RVR. He was placed on cardizem drip and admitted. Overnight he had periods of NSR and a fib, amiodarone drip was started with hopes of rhythm control. Echo showed a new cardiomyopathy. Patient underwent cardiac catheterization by Dr. Darion Sol. Patient was found to have mild CAD. Cardiomyopathy likely secondary to tachycardia. Patient tolerated the procedure well and was taken to the telemetry floor for recovery. Patient was transitioned to PO amiodarone and Eliquis for [facility]. For maximum medical therapy for CHF, EF of 30%,  will continue ACE and Beta Blocker    Echo results:      Left ventricle: The ventricle was moderately dilated. Systolic function   Was markedly reduced. Ejection fraction was estimated in the range of 25 % to 30 %.    -  Right ventricle: Estimated peak pressure was severely elevated in the   Range of 60-65 mmHg.     -  Left atrium: The atrium was moderately to markedly dilated.     -  Aortic valve:  There was mild to moderate regurgitation.     -  Mitral valve: There was moderate regurgitation.     -  Aorta, systemic arteries: The root exhibited moderate dilation (4.5cm).     The morning of discharge, patient was up feeling well without any complaints of chest pain or shortness of breath. Patient's right radial cath site was clean, dry and intact without hematoma or bruit. Patient's labs were WNL. Patient was seen and examined by Dr. Neena Bueno and determined stable and ready for discharge. For maximum medical therapy for CHF, EF of 30%,  will continue ACE and Beta Blocker The patient will follow up with [facility] Cardiology -- Dr. Neena Bueno on 7/14/2020 @ 21 [phone]. Patient will have a BMP prior to follow up. Patient has been referred to cardiac rehab. DISPOSITION: The patient is being discharged home in stable condition on a low saturated fat, low cholesterol and low salt diet. The patient is instructed to advance activities as tolerated to the limit of fatigue or shortness of breath. The patient is instructed to avoid all heavy lifting, straining, stooping or squatting for 3-5 days. The patient is instructed to watch the cath site for bleeding/oozing; if seen, the patient is instructed to apply firm pressure with a clean cloth and call [facility] Cardiology at [phone]. The patient is instructed to watch for signs of infection which include: increasing area of redness, fever/hot to touch or purulent drainage at the catheterization site. The patient is instructed not to soak in a bathtub for 7-10 days, but is cleared to shower. The patient is instructed to call the office or return to the ER for immediate evaluation for any shortness of breath or chest pain not relieved by NTG. Discharge Exam:   Visit Vitals  /67   Pulse 64   Temp 97.9 °F (36.6 °C)   Resp 16   Ht 6' (1.829 m)   Wt 90.3 kg (199 lb 1.6 oz)   SpO2 94%   BMI 27.00 kg/m²     Patient has been seen by Dr. Neena Bueno: see his progress note for exam details.     Recent Results (from the past 24 hour(s))   METABOLIC PANEL, BASIC    Collection Time: 07/08/20  5:19 AM   Result Value Ref Range    Sodium 143 136 - 145 mmol/L    Potassium 3.4 (L) 3.5 - 5.1 mmol/L    Chloride 110 (H) 98 - 107 mmol/L    CO2 24 21 - 32 mmol/L    Anion gap 9 7 - 16 mmol/L    Glucose 96 65 - 100 mg/dL    BUN 22 8 - 23 MG/DL    Creatinine 1.33 0.8 - 1.5 MG/DL    GFR est AA >60 >60 ml/min/1.73m2    GFR est non-AA 56 (L) >60 ml/min/1.73m2    Calcium 8.4 8.3 - 10.4 MG/DL   MAGNESIUM    Collection Time: 07/08/20  5:19 AM   Result Value Ref Range    Magnesium 2.2 1.8 - 2.4 mg/dL         Patient Instructions:     Current Discharge Medication List      START taking these medications    Details   carvediloL (COREG) 3.125 mg tablet Take 1 Tab by mouth two (2) times daily (with meals). Qty: 60 Tab, Refills: 11      lisinopriL (PRINIVIL, ZESTRIL) 2.5 mg tablet Take 1 Tab by mouth daily. Qty: 30 Tab, Refills: 11      apixaban (ELIQUIS) 5 mg tablet Take 1 Tab by mouth two (2) times a day. Qty: 60 Tab, Refills: 11      furosemide (LASIX) 40 mg tablet Take 1 Tab by mouth daily. Qty: 30 Tab, Refills: 11      potassium chloride (K-DUR, KLOR-CON) 20 mEq tablet Take 2 Tabs by mouth daily. Qty: 30 Tab, Refills: 11      !! amiodarone (CORDARONE) 200 mg tablet Take 2 Tabs by mouth two (2) times a day for 6 days, THEN 1 Tab two (2) times a day for 14 days. Qty: 52 Tab, Refills: 0      !! amiodarone (CORDARONE) 200 mg tablet Take 1 Tab by mouth daily. Start on 7/28/2020  Qty: 30 Tab, Refills: 6       !! - Potential duplicate medications found. Please discuss with provider.             Signed:  Verito Chaidez NP  7/8/2020  9:04 AM

## 2020-07-08 NOTE — PROGRESS NOTES
Advanced Consult with the patient and his wife. HCPOA completed and information placed on patient's chart. Andres Titus MDIV  .

## 2020-07-14 PROBLEM — I50.22 SYSTOLIC CHF, CHRONIC (HCC): Status: ACTIVE | Noted: 2020-07-14

## 2020-10-22 ENCOUNTER — HOSPITAL ENCOUNTER (OUTPATIENT)
Dept: LAB | Age: 75
Discharge: HOME | End: 2020-10-22
Payer: MEDICARE

## 2020-10-22 DIAGNOSIS — I48.91 ATRIAL FIBRILLATION WITH RAPID VENTRICULAR RESPONSE (HCC): ICD-10-CM

## 2020-10-22 LAB
ANION GAP SERPL CALC-SCNC: 5 MMOL/L (ref 7–16)
BUN SERPL-MCNC: 14 MG/DL (ref 8–23)
CALCIUM SERPL-MCNC: 9.1 MG/DL (ref 8.3–10.4)
CHLORIDE SERPL-SCNC: 107 MMOL/L (ref 98–107)
CO2 SERPL-SCNC: 29 MMOL/L (ref 21–32)
CREAT SERPL-MCNC: 1.44 MG/DL (ref 0.8–1.5)
GLUCOSE SERPL-MCNC: 92 MG/DL (ref 65–100)
POTASSIUM SERPL-SCNC: 3.9 MMOL/L (ref 3.5–5.1)
SODIUM SERPL-SCNC: 141 MMOL/L (ref 136–145)

## 2020-10-22 PROCEDURE — 80048 BASIC METABOLIC PNL TOTAL CA: CPT

## 2020-10-22 PROCEDURE — 36415 COLL VENOUS BLD VENIPUNCTURE: CPT

## 2021-06-09 PROBLEM — R00.1 BRADYCARDIA: Status: ACTIVE | Noted: 2021-06-09

## 2022-03-19 PROBLEM — R00.1 BRADYCARDIA: Status: ACTIVE | Noted: 2021-06-09

## 2022-03-19 PROBLEM — R53.83 FATIGUE: Status: ACTIVE | Noted: 2020-07-03

## 2022-03-19 PROBLEM — I50.22 SYSTOLIC CHF, CHRONIC (HCC): Status: ACTIVE | Noted: 2020-07-14

## 2022-03-19 PROBLEM — I48.91 ATRIAL FIBRILLATION WITH RAPID VENTRICULAR RESPONSE (HCC): Status: ACTIVE | Noted: 2020-07-03

## 2022-03-19 PROBLEM — E87.6 HYPOKALEMIA: Status: ACTIVE | Noted: 2020-07-03

## 2022-03-19 PROBLEM — R06.02 SOB (SHORTNESS OF BREATH): Status: ACTIVE | Noted: 2020-07-03

## 2022-03-20 PROBLEM — R60.0 BILATERAL LOWER EXTREMITY EDEMA: Status: ACTIVE | Noted: 2020-07-03

## 2022-09-19 ENCOUNTER — TELEPHONE (OUTPATIENT)
Dept: CARDIOLOGY CLINIC | Age: 77
End: 2022-09-19

## 2022-09-19 NOTE — TELEPHONE ENCOUNTER
Pt's spouse notified of providers response. I updated medication list . She states she read the sodium content in his frozen meals and correlated him eating those and we he started swelling. She will have him start eating home cooked meals in addition to stopping lisinopril. I advised adequate hydration with water and to bring WT/ BP/ HR readings to upcoming appt. Questions and concerns addressed. inst to call for additional needs. She  verb understanding.

## 2022-09-19 NOTE — TELEPHONE ENCOUNTER
Spoke with patient's wife- she expressed concerns for edema in arms and legs for 1 month. He has been having dizzy spells, decreased BP with one reading around 88/54, and decreased appetite. Sustained a fall last week due to balance, denied any injury. Denies CP or SOB. doesn't weigh daily. Current /55 HR 78. Taking medication as prescribed. Current Outpatient Medications:     amiodarone (CORDARONE) 200 MG tablet, Take 1 tablet by mouth once daily, Disp: , Rfl:     apixaban (ELIQUIS) 5 MG TABS tablet, Take 5 mg by mouth 2 times daily, Disp: , Rfl:     aspirin 81 MG chewable tablet, Take 81 mg by mouth daily, Disp: , Rfl:     furosemide (LASIX) 40 MG tablet, Take 40 mg by mouth daily, Disp: , Rfl:     lisinopril (PRINIVIL;ZESTRIL) 10 MG tablet, Take 10 mg by mouth daily, Disp: , Rfl:     LORazepam (ATIVAN) 0.5 MG tablet, Take 0.5 mg by mouth 2 times daily. , Disp: , Rfl:     potassium chloride (KLOR-CON M) 20 MEQ extended release tablet, Take 40 mEq by mouth daily, Disp: , Rfl:       No PCP and unwilling to go to doctor  Please advise if you would like to work him in sooner or have any recommendations prior to appt next Monday 9/26

## 2022-10-05 RX ORDER — AMIODARONE HYDROCHLORIDE 200 MG/1
TABLET ORAL
Qty: 90 TABLET | Refills: 0 | Status: SHIPPED | OUTPATIENT
Start: 2022-10-05

## 2022-10-05 NOTE — TELEPHONE ENCOUNTER
Requested Prescriptions     Pending Prescriptions Disp Refills    amiodarone (CORDARONE) 200 MG tablet [Pharmacy Med Name: Amiodarone HCl 200 MG Oral Tablet] 90 tablet 0     Sig: Take 1 tablet by mouth once daily

## 2022-10-12 ENCOUNTER — TELEPHONE (OUTPATIENT)
Dept: CARDIOLOGY CLINIC | Age: 77
End: 2022-10-12

## 2022-10-12 NOTE — TELEPHONE ENCOUNTER
Patients wife called stating her  is experiencing Low BP. Last nights reading 88/43. Fluid is weeping out of his arms.

## 2022-10-12 NOTE — TELEPHONE ENCOUNTER
Returned call to Trey Lopez see if pt wants Hospice Referral. This nurse is off this afternoon and can make referral before leaving, or another nurse can make referral, prn. Trey Lopez voiced understanding. She states talking to pt. Encouraging ER. Pt states will wait for FU visit 10/24. Informed her FU is 11/9/22. 3:45p. Previous cancelled visit was 9/26. Encourage return call, prn. Trey Lopez voiced understanding and thanks. Does not want Hospice Referral at this time.  cgh

## 2022-10-12 NOTE — TELEPHONE ENCOUNTER
Informed pt's wife, Ирина Archer of Dr. Lawyer Dyson response. Discuss with pt and return call. Can place Hospice Referral today and someone could evaluate him today or tomorrow. Ирина Snydern voiced understanding and agreement with POC. [facility]  Pt's granddaughter/caregiver, Saul Biswas called asking for pt to be seen. She states pt refuses hospital, refused to go with EMS. Pt has so much fluid build up he can hardly open his eyes. Informed Saray, she's not on pt's NATALEE. Cannot discuss any details with her. Advise she call pt or pt's wife. Saul Biswas voiced understanding.  [facility]

## 2022-10-12 NOTE — TELEPHONE ENCOUNTER
Pt's wife, Argenis Franco reports pt's BP 88/43, fluid weeping from arms. O2 Sats 93%. Pt's PCP retired. Pt does not want to find/see another PCP. Argenis Franco states good friend, who's a nurse visited pt yesterday and called EMS due to worsening sx. Pt refused to go to ER. Informed Aregnis Franco lot of non-cardiac reasons for sx. Advise ER. Argenis Franco voiced understanding. States pt will not go. Call 9/19/22 similar sx.  [facility] [place] advised FU. Appt 9/26/22. Pt cancelled.  Argenis Franco states pt said he didn't feel well. cgh